# Patient Record
Sex: FEMALE | Race: WHITE | Employment: OTHER | URBAN - METROPOLITAN AREA
[De-identification: names, ages, dates, MRNs, and addresses within clinical notes are randomized per-mention and may not be internally consistent; named-entity substitution may affect disease eponyms.]

---

## 2017-01-05 ENCOUNTER — HOSPITAL ENCOUNTER (OUTPATIENT)
Dept: MAMMOGRAPHY | Age: 67
Discharge: HOME | End: 2017-01-05
Attending: OBSTETRICS & GYNECOLOGY
Payer: MEDICARE

## 2017-01-05 DIAGNOSIS — Z12.39 BREAST CANCER SCREENING: ICD-10-CM

## 2017-01-05 DIAGNOSIS — Z12.39 SCREENING FOR BREAST CANCER: ICD-10-CM

## 2017-01-05 DIAGNOSIS — C54.9 MALIGNANT NEOPLASM OF CORPUS UTERI, EXCEPT ISTHMUS (HCC): ICD-10-CM

## 2017-01-05 PROCEDURE — 77067 SCR MAMMO BI INCL CAD: CPT

## 2017-04-18 ENCOUNTER — OFFICE VISIT (OUTPATIENT)
Dept: GYNECOLOGY | Age: 67
End: 2017-04-18

## 2017-04-18 VITALS
HEIGHT: 63 IN | HEART RATE: 93 BPM | WEIGHT: 175.4 LBS | SYSTOLIC BLOOD PRESSURE: 164 MMHG | BODY MASS INDEX: 31.08 KG/M2 | DIASTOLIC BLOOD PRESSURE: 86 MMHG

## 2017-04-18 DIAGNOSIS — C54.9 MALIGNANT NEOPLASM OF CORPUS UTERI, EXCEPT ISTHMUS (HCC): Primary | ICD-10-CM

## 2017-04-18 RX ORDER — METHYLPREDNISOLONE 4 MG/1
TABLET ORAL
COMMUNITY
Start: 2017-02-06 | End: 2017-04-18 | Stop reason: ALTCHOICE

## 2017-04-18 RX ORDER — DICLOFENAC SODIUM 75 MG/1
TABLET, DELAYED RELEASE ORAL
COMMUNITY
Start: 2017-02-03 | End: 2020-03-16 | Stop reason: CLARIF

## 2017-04-18 RX ORDER — HYDROCODONE BITARTRATE AND ACETAMINOPHEN 5; 325 MG/1; MG/1
TABLET ORAL
COMMUNITY
Start: 2017-02-01 | End: 2020-03-16 | Stop reason: CLARIF

## 2017-04-18 RX ORDER — CLOTRIMAZOLE 10 MG/1
LOZENGE ORAL; TOPICAL
COMMUNITY
Start: 2017-02-09 | End: 2017-04-18 | Stop reason: ALTCHOICE

## 2017-04-18 RX ORDER — NYSTATIN 100000 [USP'U]/ML
SUSPENSION ORAL
COMMUNITY
Start: 2017-03-01 | End: 2017-04-18 | Stop reason: ALTCHOICE

## 2017-04-18 RX ORDER — CIPROFLOXACIN 250 MG/1
TABLET, FILM COATED ORAL
COMMUNITY
Start: 2017-03-01 | End: 2017-04-18 | Stop reason: ALTCHOICE

## 2017-04-18 RX ORDER — GUAIFENESIN 600 MG/1
600 TABLET, EXTENDED RELEASE ORAL 2 TIMES DAILY
COMMUNITY

## 2017-04-18 NOTE — PATIENT INSTRUCTIONS
[employer] Activation    Thank you for requesting access to [employer]. Please follow the instructions below to securely access and download your online medical record. [employer] allows you to send messages to your doctor, view your test results, renew your prescriptions, schedule appointments, and more. How Do I Sign Up? 1. In your internet browser, go to [email or web address]. 2. Click on the First Time User? Click Here link in the Sign In box. You will see the New Member Sign Up page. 3. Enter your [employer] Access Code exactly as it appears below. You will not need to use this code after youve completed the sign-up process. If you do not sign up before the expiration date, you must request a new code. [employer] Access Code: [email or web address]  Expires: 2017  2:25 PM (This is the date your [employer] access code will )    4. Enter the last four digits of your Social Security Number (xxxx) and Date of Birth (mm/dd/yyyy) as indicated and click Submit. You will be taken to the next sign-up page. 5. Create a [employer] ID. This will be your [employer] login ID and cannot be changed, so think of one that is secure and easy to remember. 6. Create a [employer] password. You can change your password at any time. 7. Enter your Password Reset Question and Answer. This can be used at a later time if you forget your password. 8. Enter your e-mail address. You will receive e-mail notification when new information is available in [address] 9. Click Sign Up. You can now view and download portions of your medical record. 10. Click the Download Summary menu link to download a portable copy of your medical information. Additional Information    If you have questions, please visit the Frequently Asked Questions section of the [employer] website at [email or web address] Remember, [employer] is NOT to be used for urgent needs. For medical emergencies, dial 911.

## 2017-04-18 NOTE — PROGRESS NOTES
[address] [facility] [address], [address] [facility]  [phone]  MD Lisa Smith MD    Patient ID:  Moy Clancy  [id number]  1950/66 y.o. Visit date: 4/18/2017    INTERVAL HISTORY:  Moy Clancy is a  female with a history of stage IA, grade 1 endometrial cancer diagnosed in August 2010. She underwent a laparoscopic hysterectomy with staging and required no adjuvant therapy based upon the favorable pathology. She presents today complaining about pressure in the vaginal area. Past Medical History:   Diagnosis Date    Arthritis     Spine    Contact dermatitis and other eczema, due to unspecified cause     roseacea    GERD (gastroesophageal reflux disease)     Hypercholesterolemia     Hypertension     Other ill-defined conditions     Nasal Allergies    Other ill-defined conditions     Tendonitis in Left Knee    Other ill-defined conditions     cancer uterine    Tendonitis of shoulder 2/15    left shoulder    Vertigo        Past Surgical History:   Procedure Laterality Date    ENDOSCOPY, COLON, DIAGNOSTIC  2008    HX CHOLECYSTECTOMY  1998    HX DILATION AND CURETTAGE  2006, 1974, 1973    HX HEENT  12/14    root canal    HX HYSTERECTOMY  8/24/10    TLH/BSO/Lap    HX TUBAL LIGATION         Social History     Social History    Marital status:      Spouse name: N/A    Number of children: N/A    Years of education: N/A     Occupational History    Not on file.      Social History Main Topics    Smoking status: Former Smoker     Quit date: 1/1/1991    Smokeless tobacco: Former User    Alcohol use No    Drug use: No    Sexual activity: Yes     Partners: Male     Birth control/ protection: None     Other Topics Concern    Not on file     Social History Narrative       Family History   Problem Relation Age of Onset    Hypertension Mother     Stroke Mother     Diabetes Mother     Stroke Father  Stroke Brother     Diabetes Brother     Hypertension Brother     Cancer Maternal Uncle      colon cancer    Cancer Maternal Uncle      colon cancer       Current Outpatient Prescriptions on File Prior to Visit   Medication Sig Dispense Refill    calcium-cholecalciferol, d3, (CALCIUM 600 + D) 600-125 mg-unit tab Take  by mouth.  esomeprazole (NEXIUM) 40 mg capsule Take  by mouth daily.  EPIPEN 0.3 mg/0.3 mL injection       VESICARE 10 mg tablet       cetirizine (ZYRTEC) 10 mg tablet Take 10 mg by mouth daily.  indapamide (LOZOL) 2.5 mg tablet Take 2.5 mg by mouth daily.  fluticasone (FLONASE) 50 mcg/Actuation nasal spray 2 Sprays by Nasal route daily.  aspirin 81 mg tablet Take  by mouth daily.  carvedilol (COREG) 25 mg tablet Take 25 mg by mouth two (2) times daily (with meals).  multivitamins-ca-iron-minerals (ONE-A-DAY WOMENS FORMULA) 27-0.4 mg Tab Take 1 Tab by mouth daily. No current facility-administered medications on file prior to visit.         Allergies   Allergen Reactions    Bactrim [Sulfamethoxazole-Trimethoprim] Rash    Food Extracts Other (comments)     Jalapeno Peppers:  Numbness and Swelling    Metronidazole Nausea and Vomiting    Morphine Nausea Only    Penicillins Rash    Seldane Other (comments)     Memory loss    Vioxx [Rofecoxib] Other (comments)     Tongue swells, facial numbeness       ROS:  Negative    OBJECTIVE:  PHYSICAL EXAM  VITAL SIGNS: Visit Vitals    /86 (BP 1 Location: Right arm, BP Patient Position: Sitting)    Pulse 93    Ht 5' 2.99\" (1.6 m)    Wt 175 lb 6.4 oz (79.6 kg)    BMI 31.08 kg/m2      GENERAL FABIO: in no apparent distress and well developed and well nourished   HEENT: within normal limits   RESPIRATORY: lungs clear to auscultation   CARDIOVASC: Regular rate and rhythm   GASTROINT: soft, non-tender, without masses or organomegaly   MUSCULOSKEL: no joint tenderness, deformity or swelling   INTEGUMENT: no rash or abnormalities   EXTREMITIES: extremities normal, atraumatic, no cyanosis or edema   PELVIC: External genitalia: normal general appearance  Vaginal: normal without tenderness, induration or masses; large cystocele  Uterus: absent   RECTAL: rectal exam not indicated   RTICHIE SURVEY: Cervical, supraclavicular, and axillary nodes normal.   NEURO: Grossly normal       IMPRESSION AND PLAN:  Teri Hayes has a history of stage I, grade 1 endometrial cancer. She has no evidence of disease on exam.  I reassured her that the pressure she is feeling is a cystocele. She wishes to go see a urologist for evaluation for possible surgery. She is going to see someone at [facility].       Kanwal Cook MD  4/18/2017/2:44 PM

## 2017-04-18 NOTE — PROGRESS NOTES
Pt c/o lower abdominal pressure when walking and feels like she may have a prolapse. The pt is no longer taking Hydrocodone.

## 2018-01-12 ENCOUNTER — HOSPITAL ENCOUNTER (OUTPATIENT)
Dept: VASCULAR SURGERY | Age: 68
Discharge: HOME | End: 2018-01-12
Attending: INTERNAL MEDICINE
Payer: MEDICARE

## 2018-01-12 DIAGNOSIS — M79.661 PAIN OF RIGHT LOWER LEG: ICD-10-CM

## 2018-01-12 PROCEDURE — 93970 EXTREMITY STUDY: CPT

## 2018-01-12 NOTE — PROGRESS NOTES
[facility]  Preliminary Report:  Venous Duplex Leg    Bilateral leg venous duplex was performed. All deep and superficial veins appear compressible with normal Doppler characteristics. Final report to follow.

## 2018-01-12 NOTE — PROCEDURES
[facility]  *** FINAL REPORT ***    Name: Charla Bravo  MRN: [id number]    Outpatient  : 1950  HIS Order #: [id number]  [address] [facility] Visit #: [id number]  Date: 2018    TYPE OF TEST: Peripheral Venous Testing    REASON FOR TEST  Pain in limb    Right Leg:-  Deep venous thrombosis:           No  Superficial venous thrombosis:    No  Deep venous insufficiency:        No  Superficial venous insufficiency: No    Left Leg:-  Deep venous thrombosis:           No  Superficial venous thrombosis:    No  Deep venous insufficiency:        No  Superficial venous insufficiency: No      INTERPRETATION/FINDINGS  PROCEDURE:  Venous duplex examination using B-mode, color flow and  spectral Doppler of the lower extremity veins. Right leg :  1. Deep vein(s) visualized include the common femoral, proximal  femoral, mid femoral, distal femoral, popliteal(above knee),  popliteal(fossa), popliteal(below knee), posterior tibial and peroneal   veins. 2. No evidence of deep venous thrombosis detected in the veins  visualized. 3. Superficial vein(s) visualized include the great saphenous vein. 4. No evidence of superficial thrombosis detected. 5. No evidence of reflux detected in the deep veins visualized. 6. No evidence of reflux detected in the superficial veins visualized. Left leg :  1. Deep vein(s) visualized include the common femoral, proximal  femoral, mid femoral, distal femoral, popliteal(above knee),  popliteal(fossa), popliteal(below knee), posterior tibial and peroneal   veins. 2. No evidence of deep venous thrombosis detected in the veins  visualized. 3. Superficial vein(s) visualized include the great saphenous vein. 4. No evidence of superficial thrombosis detected. 5. No evidence of reflux detected in the deep veins visualized. 6. No evidence of reflux detected in the superficial veins visualized.     ADDITIONAL COMMENTS    I have personally reviewed the data relevant to the interpretation of  this  study.     TECHNOLOGIST: Saul Boone RVT  Signed: 01/12/2018 02:18 PM    PHYSICIAN: Neena Lundborg, MD  Signed: 01/12/2018 06:16 PM

## 2018-03-06 ENCOUNTER — OFFICE VISIT (OUTPATIENT)
Dept: GYNECOLOGY | Age: 68
End: 2018-03-06

## 2018-03-06 ENCOUNTER — HOSPITAL ENCOUNTER (OUTPATIENT)
Dept: LAB | Age: 68
Discharge: HOME | End: 2018-03-06
Payer: MEDICARE

## 2018-03-06 VITALS
HEIGHT: 63 IN | BODY MASS INDEX: 31.43 KG/M2 | HEART RATE: 74 BPM | DIASTOLIC BLOOD PRESSURE: 90 MMHG | WEIGHT: 177.4 LBS | SYSTOLIC BLOOD PRESSURE: 142 MMHG

## 2018-03-06 DIAGNOSIS — C54.9 MALIGNANT NEOPLASM OF CORPUS UTERI, EXCEPT ISTHMUS (HCC): Primary | ICD-10-CM

## 2018-03-06 PROCEDURE — 88175 CYTOPATH C/V AUTO FLUID REDO: CPT | Performed by: OBSTETRICS & GYNECOLOGY

## 2018-03-06 RX ORDER — MELATONIN
DAILY
COMMUNITY

## 2018-03-06 NOTE — PROGRESS NOTES
One year check up. Pt states she had a mesh/sling for a prolapse bladder in 6/2017. Pt had rectocele/cystocele repair three weeks ago.

## 2018-03-06 NOTE — PROGRESS NOTES
[address], [address] [place]  [phone]  MD Paris Cummins MD    Patient ID:  Casa Casas  [id number]  1950/67 y.o. Visit date: 3/6/2018    INTERVAL HISTORY:  Casa Casas is a  female with a history of stage IA, grade 1 endometrial cancer diagnosed in August 2010. She underwent a laparoscopic hysterectomy with staging and required no adjuvant therapy based upon the favorable pathology. She presents today for routine surveillance. She is doing well and without complaints. She just had a recent repair of a cystocele and rectocele. Past Medical History:   Diagnosis Date    Arthritis     Spine    Contact dermatitis and other eczema, due to unspecified cause     roseacea    GERD (gastroesophageal reflux disease)     Hypercholesterolemia     Hypertension     Other ill-defined conditions(799.89)     Nasal Allergies    Other ill-defined conditions(799.89)     Tendonitis in Left Knee    Other ill-defined conditions(799.89)     cancer uterine    Tendonitis of shoulder 2/15    left shoulder    Vertigo        Past Surgical History:   Procedure Laterality Date    ENDOSCOPY, COLON, DIAGNOSTIC  2008    HX CHOLECYSTECTOMY  1998    HX DILATION AND CURETTAGE  2006, 1974, 1973    HX HEENT  12/14    root canal    HX HYSTERECTOMY  8/24/10    TLH/BSO/Lap    HX TUBAL LIGATION         Social History     Social History    Marital status:      Spouse name: N/A    Number of children: N/A    Years of education: N/A     Occupational History    Not on file.      Social History Main Topics    Smoking status: Former Smoker     Quit date: 1/1/1991    Smokeless tobacco: Former User    Alcohol use No    Drug use: No    Sexual activity: Yes     Partners: Male     Birth control/ protection: None     Other Topics Concern    Not on file     Social History Narrative       Family History   Problem Relation Age of Onset    Hypertension Mother     Stroke Mother     Diabetes Mother     Stroke Father     Stroke Brother     Diabetes Brother     Hypertension Brother     Cancer Maternal Uncle      colon cancer    Cancer Maternal Uncle      colon cancer       Current Outpatient Prescriptions on File Prior to Visit   Medication Sig Dispense Refill    diclofenac EC (VOLTAREN) 75 mg EC tablet       HYDROcodone-acetaminophen (NORCO) 5-325 mg per tablet       guaiFENesin ER (MUCINEX) 600 mg ER tablet Take 600 mg by mouth two (2) times a day.  calcium-cholecalciferol, d3, (CALCIUM 600 + D) 600-125 mg-unit tab Take  by mouth.  esomeprazole (NEXIUM) 40 mg capsule Take  by mouth daily.  EPIPEN 0.3 mg/0.3 mL injection       VESICARE 10 mg tablet       carvedilol (COREG) 25 mg tablet Take 25 mg by mouth two (2) times daily (with meals).  cetirizine (ZYRTEC) 10 mg tablet Take 10 mg by mouth daily.  indapamide (LOZOL) 2.5 mg tablet Take 2.5 mg by mouth daily.  fluticasone (FLONASE) 50 mcg/Actuation nasal spray 2 Sprays by Nasal route daily.  aspirin 81 mg tablet Take  by mouth daily.  multivitamins-ca-iron-minerals (ONE-A-DAY WOMENS FORMULA) 27-0.4 mg Tab Take 1 Tab by mouth daily. No current facility-administered medications on file prior to visit.         Allergies   Allergen Reactions    Bactrim [Sulfamethoxazole-Trimethoprim] Rash    Food Extracts Other (comments)     Jalapeno Peppers:  Numbness and Swelling    Metronidazole Nausea and Vomiting    Morphine Nausea Only    Penicillins Rash    Seldane Other (comments)     Memory loss    Vioxx [Rofecoxib] Other (comments)     Tongue swells, facial numbeness       ROS:  Negative    OBJECTIVE:  PHYSICAL EXAM  VITAL SIGNS: Visit Vitals    Ht 5' 2.99\" (1.6 m)    Wt 177 lb 6.4 oz (80.5 kg)    BMI 31.43 kg/m2      GENERAL FABIO: in no apparent distress and well developed and well nourished   HEENT: within normal limits   RESPIRATORY: lungs clear to auscultation   CARDIOVASC: Regular rate and rhythm   GASTROINT: soft, non-tender, without masses or organomegaly   MUSCULOSKEL: no joint tenderness, deformity or swelling   INTEGUMENT: no rash or abnormalities   EXTREMITIES: extremities normal, atraumatic, no cyanosis or edema   PELVIC: External genitalia: normal general appearance  Vaginal: normal without tenderness, induration or masses  Uterus: absent   RECTAL: rectal exam not indicated   RITCHIE SURVEY: Cervical, supraclavicular, and axillary nodes normal.   NEURO: Grossly normal       IMPRESSION AND PLAN:  Laurent Moralez has a history of stage I, grade 1 endometrial cancer. She has no evidence of disease on exam.  We will plan on seeing her back in one year for further surveillance.       Keyonna Jhaveri MD  3/6/2018/2:44 PM

## 2018-03-09 ENCOUNTER — HOSPITAL ENCOUNTER (OUTPATIENT)
Dept: MAMMOGRAPHY | Age: 68
Discharge: HOME | End: 2018-03-09
Attending: INTERNAL MEDICINE
Payer: MEDICARE

## 2018-03-09 DIAGNOSIS — Z12.39 SCREENING BREAST EXAMINATION: ICD-10-CM

## 2018-03-09 PROCEDURE — 77067 SCR MAMMO BI INCL CAD: CPT

## 2019-04-15 NOTE — PROGRESS NOTES
Year check up for history of endometrial cancer. Patient reports no abnormal bleeding or pain. 1. Have you been to the ER, urgent care clinic since your last visit? Hospitalized since your last visit? No    2. Have you seen or consulted any other health care providers outside of the [address] [place] [address] since your last visit? Include any pap smears or colon screening.  No

## 2019-04-16 ENCOUNTER — OFFICE VISIT (OUTPATIENT)
Dept: GYNECOLOGY | Age: 69
End: 2019-04-16

## 2019-04-16 VITALS
BODY MASS INDEX: 32.78 KG/M2 | HEART RATE: 78 BPM | WEIGHT: 185 LBS | DIASTOLIC BLOOD PRESSURE: 85 MMHG | SYSTOLIC BLOOD PRESSURE: 162 MMHG | HEIGHT: 63 IN

## 2019-04-16 DIAGNOSIS — C54.9 MALIGNANT NEOPLASM OF CORPUS UTERI, EXCEPT ISTHMUS (HCC): Primary | ICD-10-CM

## 2019-04-16 RX ORDER — RANITIDINE 150 MG/1
150 TABLET, FILM COATED ORAL 2 TIMES DAILY
COMMUNITY
End: 2020-03-16 | Stop reason: CLARIF

## 2019-04-16 RX ORDER — LEVOCETIRIZINE DIHYDROCHLORIDE 5 MG/1
TABLET, FILM COATED ORAL
COMMUNITY
End: 2020-03-16 | Stop reason: CLARIF

## 2019-04-16 NOTE — PROGRESS NOTES
[facility]  [address] [facility], [address] [facility]  [phone]      Patient ID:  Leslie Sierra  [id number]  1950/68 y.o. Visit date: 4/16/2019    INTERVAL HISTORY:  Leslie Sierar is a  female with a history of stage IA, grade 1 endometrial cancer diagnosed in August 2010. She underwent a laparoscopic hysterectomy with staging and required no adjuvant therapy based upon the favorable pathology. She had a repair of a cystocele and rectocele in early 2018. She presents today for routine surveillance. She is doing well and without complaints.         Past Medical History:   Diagnosis Date    Arthritis     Spine    Contact dermatitis and other eczema, due to unspecified cause     roseacea    GERD (gastroesophageal reflux disease)     Hypercholesterolemia     Hypertension     Other ill-defined conditions(799.89)     Nasal Allergies    Other ill-defined conditions(799.89)     Tendonitis in Left Knee    Other ill-defined conditions(799.89)     cancer uterine    Tendonitis of shoulder 2/15    left shoulder    Vertigo        Past Surgical History:   Procedure Laterality Date    ENDOSCOPY, COLON, DIAGNOSTIC  2008    HX CHOLECYSTECTOMY  1998    HX DILATION AND CURETTAGE  2006, 1974, 1973    HX HEENT  12/14    root canal    HX HYSTERECTOMY  8/24/10    TLH/BSO/Lap    HX TUBAL LIGATION         Social History     Socioeconomic History    Marital status:      Spouse name: Not on file    Number of children: Not on file    Years of education: Not on file    Highest education level: Not on file   Occupational History    Not on file   Social Needs    Financial resource strain: Not on file    Food insecurity:     Worry: Not on file     Inability: Not on file    Transportation needs:     Medical: Not on file     Non-medical: Not on file   Tobacco Use    Smoking status: Former Smoker     Last attempt to quit: 1/1/1991     Years since quittin.3    Smokeless tobacco: Former User   Substance and Sexual Activity    Alcohol use: No    Drug use: No    Sexual activity: Yes     Partners: Male     Birth control/protection: None   Lifestyle    Physical activity:     Days per week: Not on file     Minutes per session: Not on file    Stress: Not on file   Relationships    Social connections:     Talks on phone: Not on file     Gets together: Not on file     Attends Spiritism service: Not on file     Active member of club or organization: Not on file     Attends meetings of clubs or organizations: Not on file     Relationship status: Not on file    Intimate partner violence:     Fear of current or ex partner: Not on file     Emotionally abused: Not on file     Physically abused: Not on file     Forced sexual activity: Not on file   Other Topics Concern    Not on file   Social History Narrative    Not on file       Family History   Problem Relation Age of Onset    Hypertension Mother     Stroke Mother     Diabetes Mother     Stroke Father     Stroke Brother     Diabetes Brother     Hypertension Brother     Cancer Maternal Uncle         colon cancer    Cancer Maternal Uncle         colon cancer       Current Outpatient Medications on File Prior to Visit   Medication Sig Dispense Refill    raNITIdine (ZANTAC) 150 mg tablet Take 150 mg by mouth two (2) times a day.  levocetirizine (XYZAL) 5 mg tablet Take  by mouth.  guaiFENesin ER (MUCINEX) 600 mg ER tablet Take 600 mg by mouth two (2) times a day.  esomeprazole (NEXIUM) 40 mg capsule Take  by mouth daily.  EPIPEN 0.3 mg/0.3 mL injection       carvedilol (COREG) 25 mg tablet Take 25 mg by mouth two (2) times daily (with meals).  cetirizine (ZYRTEC) 10 mg tablet Take 10 mg by mouth daily.  indapamide (LOZOL) 2.5 mg tablet Take 2.5 mg by mouth daily.  fluticasone (FLONASE) 50 mcg/Actuation nasal spray 2 Sprays by Nasal route daily.       multivitamins-ca-iron-minerals (ONE-A-DAY WOMENS FORMULA) 27-0.4 mg Tab Take 1 Tab by mouth daily.  cholecalciferol (VITAMIN D3) 1,000 unit tablet Take  by mouth daily.  diclofenac EC (VOLTAREN) 75 mg EC tablet       HYDROcodone-acetaminophen (NORCO) 5-325 mg per tablet       calcium-cholecalciferol, d3, (CALCIUM 600 + D) 600-125 mg-unit tab Take  by mouth.  VESICARE 10 mg tablet       aspirin 81 mg tablet Take  by mouth daily. No current facility-administered medications on file prior to visit. Allergies   Allergen Reactions    Bactrim [Sulfamethoxazole-Trimethoprim] Rash    Clindamycin Itching    Erythromycin Itching    Food Extracts Other (comments)     Jalapeno Peppers:  Numbness and Swelling    Metronidazole Nausea and Vomiting    Morphine Nausea Only    Penicillins Rash    Seldane Other (comments)     Memory loss    Vioxx [Rofecoxib] Other (comments)     Tongue swells, facial numbeness       ROS:  Negative    OBJECTIVE:  PHYSICAL EXAM  VITAL SIGNS: Visit Vitals  /85 (BP 1 Location: Right arm, BP Patient Position: Sitting)   Pulse 78   Ht 5' 2.99\" (1.6 m)   Wt 185 lb (83.9 kg)   BMI 32.78 kg/m²      GENERAL FABIO: in no apparent distress and well developed and well nourished   HEENT: within normal limits   RESPIRATORY: lungs clear to auscultation   CARDIOVASC: Regular rate and rhythm   GASTROINT: soft, non-tender, without masses or organomegaly   MUSCULOSKEL: no joint tenderness, deformity or swelling   INTEGUMENT: no rash or abnormalities   EXTREMITIES: extremities normal, atraumatic, no cyanosis or edema   PELVIC: External genitalia: normal general appearance  Vaginal: normal without tenderness, induration or masses  Uterus: absent   RECTAL: rectal exam not indicated   RITCHIE SURVEY: Cervical, supraclavicular, and axillary nodes normal.   NEURO: Grossly normal       IMPRESSION AND PLAN:  She Delgado has a history of stage I, grade 1 endometrial cancer.   She has no evidence of disease on exam.  We will plan on seeing her back in one year for further surveillance.       Porter Do MD  4/16/2019/2:44 PM

## 2019-04-22 ENCOUNTER — HOSPITAL ENCOUNTER (OUTPATIENT)
Dept: MAMMOGRAPHY | Age: 69
Discharge: HOME | End: 2019-04-22
Attending: INTERNAL MEDICINE
Payer: MEDICARE

## 2019-04-22 DIAGNOSIS — Z12.39 BREAST SCREENING, UNSPECIFIED: ICD-10-CM

## 2019-04-22 PROCEDURE — 77067 SCR MAMMO BI INCL CAD: CPT

## 2020-06-12 ENCOUNTER — ANESTHESIA (OUTPATIENT)
Dept: ENDOSCOPY | Age: 70
End: 2020-06-12
Payer: MEDICARE

## 2020-06-12 ENCOUNTER — HOSPITAL ENCOUNTER (OUTPATIENT)
Age: 70
Setting detail: OUTPATIENT SURGERY
Discharge: HOME | End: 2020-06-12
Attending: INTERNAL MEDICINE | Admitting: INTERNAL MEDICINE
Payer: MEDICARE

## 2020-06-12 ENCOUNTER — ANESTHESIA EVENT (OUTPATIENT)
Dept: ENDOSCOPY | Age: 70
End: 2020-06-12
Payer: MEDICARE

## 2020-06-12 VITALS
TEMPERATURE: 98.2 F | RESPIRATION RATE: 14 BRPM | HEART RATE: 84 BPM | DIASTOLIC BLOOD PRESSURE: 75 MMHG | OXYGEN SATURATION: 99 % | SYSTOLIC BLOOD PRESSURE: 148 MMHG

## 2020-06-12 PROCEDURE — 76060000031 HC ANESTHESIA FIRST 0.5 HR: Performed by: INTERNAL MEDICINE

## 2020-06-12 PROCEDURE — 88305 TISSUE EXAM BY PATHOLOGIST: CPT

## 2020-06-12 PROCEDURE — 77030013992 HC SNR POLYP ENDOSC BSC -B: Performed by: INTERNAL MEDICINE

## 2020-06-12 PROCEDURE — 74011250636 HC RX REV CODE- 250/636: Performed by: INTERNAL MEDICINE

## 2020-06-12 PROCEDURE — 74011000250 HC RX REV CODE- 250: Performed by: ANESTHESIOLOGY

## 2020-06-12 PROCEDURE — 76040000019: Performed by: INTERNAL MEDICINE

## 2020-06-12 PROCEDURE — 74011250636 HC RX REV CODE- 250/636: Performed by: ANESTHESIOLOGY

## 2020-06-12 RX ORDER — SODIUM CHLORIDE 9 MG/ML
100 INJECTION, SOLUTION INTRAVENOUS CONTINUOUS
Status: DISCONTINUED | OUTPATIENT
Start: 2020-06-12 | End: 2020-06-12 | Stop reason: HOSPADM

## 2020-06-12 RX ORDER — SODIUM CHLORIDE 0.9 % (FLUSH) 0.9 %
5-40 SYRINGE (ML) INJECTION AS NEEDED
Status: DISCONTINUED | OUTPATIENT
Start: 2020-06-12 | End: 2020-06-12 | Stop reason: HOSPADM

## 2020-06-12 RX ORDER — LIDOCAINE HYDROCHLORIDE 20 MG/ML
INJECTION, SOLUTION EPIDURAL; INFILTRATION; INTRACAUDAL; PERINEURAL AS NEEDED
Status: DISCONTINUED | OUTPATIENT
Start: 2020-06-12 | End: 2020-06-12 | Stop reason: HOSPADM

## 2020-06-12 RX ORDER — NALOXONE HYDROCHLORIDE 0.4 MG/ML
0.4 INJECTION, SOLUTION INTRAMUSCULAR; INTRAVENOUS; SUBCUTANEOUS
Status: DISCONTINUED | OUTPATIENT
Start: 2020-06-12 | End: 2020-06-12 | Stop reason: HOSPADM

## 2020-06-12 RX ORDER — FLUMAZENIL 0.1 MG/ML
0.2 INJECTION INTRAVENOUS
Status: DISCONTINUED | OUTPATIENT
Start: 2020-06-12 | End: 2020-06-12 | Stop reason: HOSPADM

## 2020-06-12 RX ORDER — PROPOFOL 10 MG/ML
INJECTION, EMULSION INTRAVENOUS AS NEEDED
Status: DISCONTINUED | OUTPATIENT
Start: 2020-06-12 | End: 2020-06-12 | Stop reason: HOSPADM

## 2020-06-12 RX ORDER — ATROPINE SULFATE 0.1 MG/ML
0.5 INJECTION INTRAVENOUS
Status: DISCONTINUED | OUTPATIENT
Start: 2020-06-12 | End: 2020-06-12 | Stop reason: HOSPADM

## 2020-06-12 RX ORDER — SODIUM CHLORIDE 0.9 % (FLUSH) 0.9 %
5-40 SYRINGE (ML) INJECTION EVERY 8 HOURS
Status: DISCONTINUED | OUTPATIENT
Start: 2020-06-12 | End: 2020-06-12 | Stop reason: HOSPADM

## 2020-06-12 RX ORDER — DEXTROMETHORPHAN/PSEUDOEPHED 2.5-7.5/.8
1.2 DROPS ORAL
Status: DISCONTINUED | OUTPATIENT
Start: 2020-06-12 | End: 2020-06-12 | Stop reason: HOSPADM

## 2020-06-12 RX ADMIN — SODIUM CHLORIDE 100 ML/HR: 900 INJECTION, SOLUTION INTRAVENOUS at 09:30

## 2020-06-12 RX ADMIN — PROPOFOL 200 MG: 10 INJECTION, EMULSION INTRAVENOUS at 09:50

## 2020-06-12 RX ADMIN — LIDOCAINE HYDROCHLORIDE 40 MG: 20 INJECTION, SOLUTION EPIDURAL; INFILTRATION; INTRACAUDAL; PERINEURAL at 09:28

## 2020-06-12 NOTE — DISCHARGE INSTRUCTIONS
Alivia Motley MD  [facility], [address]  [place], [address] [facility]  [phone]  www. [email or web address]    Rich Patton  [id number]  1950    COLON DISCHARGE INSTRUCTIONS  Discomfort:  Redness at IV site- apply warm compress to area; if redness or soreness persist- contact your physician  There may be a slight amount of blood passed from the rectum  Gaseous discomfort- walking, belching will help relieve any discomfort  You may not operate a vehicle for 12 hours  You may not engage in an occupation involving machinery or appliances for rest of today  You may not drink alcoholic beverages for at least 12 hours  Avoid making any critical decisions for at least 24 hour  DIET:   High fiber diet. - however -  remember your colon is empty and a heavy meal will produce gas. Avoid these foods:  vegetables, fried / greasy foods, carbonated drinks for today      ACTIVITY:  You may resume your normal daily activities it is recommended that you spend the remainder of the day resting -  avoid any strenuous activity. CALL M.D. ANY SIGN OF:   Increasing pain, nausea, vomiting  Abdominal distension (swelling)  New increased bleeding (oral or rectal)  Fever (chills)  Pain in chest area  Bloody discharge from nose or mouth  Shortness of breath     COLONOSCOPY FINDINGS:  Your colonoscopy showed: two small polyps which were removed. Follow-up Instructions:   Call Dr. Alivia Motley if any questions or problems. Telephone # [phone]  Dr. Walker Rousseau office will notify you of the biopsy results within 7 to 10 days. Should have a repeat colonoscopy in 5 years.

## 2020-06-12 NOTE — ROUTINE PROCESS
Elisa Greenwood [facility]  1950  [id number]    Situation:  Verbal report received from: Olivia MATTHEWS  Procedure: Procedure(s):  COLONOSCOPY  ENDOSCOPIC POLYPECTOMY    Background:    Preoperative diagnosis: HX OF POLYPS  Postoperative diagnosis: Polyps, Hemorrhoids    :  Dr. Jf Troncoso  Assistant(s): Endoscopy Technician-1: Stephan Mercado  Endoscopy RN-1: Abbe Estrella    Specimens:   ID Type Source Tests Collected by Time Destination   1 : polyps Preservative Colon, Ascending  Zuleyka Connolly MD 6/12/2020 2789 Pathology     H. Pylori  no    Assessment:  Intra-procedure medications       Anesthesia gave intra-procedure sedation and medications, see anesthesia flow sheet yes    Intravenous fluids: NS@ KVO     Vital signs stable       Abdominal assessment: round and soft       Recommendation:  Discharge patient per MD order.     Family or Friend  Brian Borrego  Permission to share finding with family or friend yes

## 2020-06-12 NOTE — PROGRESS NOTES
Sandra [facility]  1950  [id number]    Situation:  Verbal report received from: Pedro Marroquin RN  Procedure: Procedure(s):  COLONOSCOPY  ENDOSCOPIC POLYPECTOMY    Background:    Preoperative diagnosis: HX OF POLYPS  Postoperative diagnosis: Polyps, Hemorrhoids    :  Dr. Vin Phillips  Assistant(s): Endoscopy Technician-1: Charline Carrillo  Endoscopy RN-1: Philippe Both    Specimens:   ID Type Source Tests Collected by Time Destination   1 : polyps Preservative Colon, Ascending  Lou Rueda MD 6/12/2020 7829 Pathology     H. Pylori  no    Assessment:  Intra-procedure medications   33219::\"100\"} mcg    Anesthesia gave intra-procedure sedation and medications, see anesthesia flow sheet yes    Intravenous fluids: NS@ KVO     Vital signs stable yes    Abdominal assessment: round and soft yes    Recommendation:  Discharge patient per MD order yes.   Return to floor no  Family or Deetta Overall,   Permission to share finding with family or friend yes

## 2020-06-12 NOTE — PROCEDURES
[facility]                  Colonoscopy Operative Report    6/12/2020      Sanjiv Hatch  [id number]  1950    Procedure Type:   Colonoscopy --screening     Indications:    Personal history of colon polyps (screening only)     Pre-operative Diagnosis: see indication above    Post-operative Diagnosis:  See findings below    :  Ara Salgado MD    Referring Provider: Rahul Fulton MD      Sedation:  MAC anesthesia Propofol    Pre-Procedural Exam:      Airway: clear,  No airway problems anticipated  Heart: RRR, without gallops or rubs  Lungs: clear bilaterally without wheezes, crackles, or rhonchi  Abdomen: soft, nontender, nondistended, bowel sounds present  Mental Status: awake, alert and oriented to person, place and time     Procedure Details:  After informed consent was obtained with all risks and benefits of procedure explained and preoperative exam completed, the patient was taken to the endoscopy suite and placed in the left lateral decubitus position. Upon sequential sedation as per above, a digital rectal exam was performed . The Olympus videocolonoscope  was inserted in the rectum and carefully advanced to the cecum, which was identified by the ileocecal valve and appendiceal orifice. The cecum was identified by the ileocecal valve and appendiceal orifice. The quality of preparation was good. The colonoscope was slowly withdrawn with careful evaluation between folds. Retroflexion in the rectum was completed demonstrating internal hemorrhoids. Findings:   Rectum: Grade 1 internal hemorrhoid(s); Sigmoid: normal  Descending Colon: normal  Transverse Colon: normal  Ascending Colon: two polyps, 4 and 7 mm, cold snared and retrieved  Cecum: normal  Terminal Ileum: not intubated      Specimen Removed:  ascending colonic polyps    Complications: None. EBL:  None.     Impression:    hemorrhoids internal, Small in size  two small colonic polyps ascending colon    Recommendations: --Await pathology. , -Repeat colonoscopy in 5 years. High fiber diet. Resume normal medication(s). Discharge Disposition:  Home in the company of a  when able to ambulate. Bj Stern MD    6/12/2020     EMELINA Foley MD  [facility], [address] [facility] [address] [place] [address]  [phone]  [email or web address]

## 2020-06-12 NOTE — H&P
Mary Wooten MD  [facility], [address] [place] [address]  [phone]  [email or web address]    Gastroenterology Outpatient History and Physical    Patient: Alexandre Barron    Physician: Che Power MD    Vital Signs: Pulse 98, resp. rate 16, SpO2 97 %. Allergies: Allergies   Allergen Reactions    Food Extracts Anaphylaxis     Jalapeno Peppers:  Numbness and Swelling    Bactrim [Sulfamethoxazole-Trimethoprim] Rash    Clindamycin Hives and Itching    Erythromycin Hives and Itching    Metronidazole Hives, Itching and Nausea and Vomiting    Morphine Nausea and Vomiting     Severe vomitting    Penicillins Rash    Seldane Palpitations     Memory loss    Vioxx [Rofecoxib] Other (comments)     Tongue swells, facial numbeness       Chief Complaint: Hx of polyps    History of Present Illness: Personal history of colonic polyps. Last colonoscopy   showed no polyps. Currently has no GI symptoms. Positive FH of colon cancer or polyps.       History:  Past Medical History:   Diagnosis Date    Arrhythmia     PVC's    Arthritis     Spine    Cancer ([facility] [address] [facility] 2010    Uternine - hysterectomy - no treatments required    Contact dermatitis and other eczema, due to unspecified cause     roseacea    GERD (gastroesophageal reflux disease)     Hypercholesterolemia     Hypertension     Other ill-defined conditions(799.89)     Nasal Allergies    Other ill-defined conditions(799.89)     Tendonitis in Left Knee    Other ill-defined conditions(799.89)     cancer uterine    Tendonitis of shoulder 2/15    left shoulder    Vertigo       Past Surgical History:   Procedure Laterality Date    ENDOSCOPY, COLON, DIAGNOSTIC  2008    HX CHOLECYSTECTOMY  1998    HX DILATION AND CURETTAGE  2006, 1974, 1973    HX HEENT  12/14    root canal    HX HYSTERECTOMY  8/24/10    TLH/BSO/Lap    HX OOPHORECTOMY Bilateral     HX TUBAL LIGATION  HX UROLOGICAL  2018    cystocele/rectalcele     HX UROLOGICAL  2017    bladder tact      Social History     Socioeconomic History    Marital status:      Spouse name: Not on file    Number of children: Not on file    Years of education: Not on file    Highest education level: Not on file   Tobacco Use    Smoking status: Former Smoker     Last attempt to quit: 1991     Years since quittin.4    Smokeless tobacco: Former User   Substance and Sexual Activity    Alcohol use: No    Drug use: No    Sexual activity: Yes     Partners: Male     Birth control/protection: None      Family History   Problem Relation Age of Onset    Hypertension Mother     Stroke Mother     Diabetes Mother     Stroke Father     Stroke Brother     Diabetes Brother     Hypertension Brother     Cancer Maternal Uncle         colon cancer    Cancer Maternal Uncle         colon cancer      Patient Active Problem List   Diagnosis Code    Malignant ne[facility] of corpus uteri, except [facility] C[facility].9       Medications:   Prior to Admission medications    Medication Sig Start Date End Date Taking? Authorizing Provider   carvedilol (COREG) 25 mg tablet Take 25 mg by mouth two (2) times daily (with meals). Yes Provider, Historical   cholecalciferol (VITAMIN D3) 1,000 unit tablet Take  by mouth daily. Provider, Historical   guaiFENesin ER (MUCINEX) 600 mg ER tablet Take 600 mg by mouth two (2) times a day. Provider, Historical   esomeprazole (NEXIUM) 40 mg capsule Take  by mouth daily. Provider, Historical   EPIPEN 0.3 mg/0.3 mL injection  12   Provider, Historical   cetirizine (ZYRTEC) 10 mg tablet Take 10 mg by mouth daily. Provider, Historical   indapamide (LOZOL) 2.5 mg tablet Take 2.5 mg by mouth daily. Provider, Historical   fluticasone (FLONASE) 50 mcg/Actuation nasal spray 2 Sprays by Nasal route daily.  6/1/10   Provider, Historical   multivitamins-ca-iron-minerals (ONE-A-DAY WOMENS FORMULA) 27-0.4 mg Tab Take 1 Tab by mouth daily.  6/1/10   Provider, Historical       Physical Exam:     General: well developed, well nourished   HEENT: unremarkable   Heart: regular rhythm no mumur    Lungs: clear   Abdominal:  benign   Neurological: unremarkable   Extremities: no edema     Findings/Diagnosis: Personal history of colonic polyps  Plan of Care/Planned Procedure: Colonoscopy with monitored anesthesia care sedation    Signed:  Hassan Rinne., MD 6/12/2020

## 2020-06-12 NOTE — ANESTHESIA PREPROCEDURE EVALUATION
Relevant Problems   No relevant active problems       Anesthetic History   No history of anesthetic complications            Review of Systems / Medical History  Patient summary reviewed, nursing notes reviewed and pertinent labs reviewed    Pulmonary  Within defined limits                 Neuro/Psych   Within defined limits           Cardiovascular    Hypertension        Dysrhythmias : PVC  Hyperlipidemia    Exercise tolerance: >4 METS     GI/Hepatic/Renal     GERD           Endo/Other        Arthritis     Other Findings              Physical Exam    Airway  Mallampati: I  TM Distance: 4 - 6 cm  Neck ROM: normal range of motion   Mouth opening: Normal     Cardiovascular  Regular rate and rhythm,  S1 and S2 normal,  no murmur, click, rub, or gallop             Dental  No notable dental hx       Pulmonary  Breath sounds clear to auscultation               Abdominal  GI exam deferred       Other Findings            Anesthetic Plan    ASA: 2  Anesthesia type: total IV anesthesia and MAC          Induction: Intravenous  Anesthetic plan and risks discussed with: Patient

## 2020-06-12 NOTE — PERIOP NOTES
Endoscope was pre-cleaned at bedside immediately following procedure by [facility], ET. Glasses returned to patient post procure.     Medications     Medication Rate/Dose/Volume Action Route Date Time   Administering User Audit    lidocaine (PF) 2% (mg) 40 mg Given IntraVENous 06/12/20  0928  Pedro Hurst DO     propofol 10 mg/mL (mg) 200 mg Given IntraVENous 06/12/20  0950  Pedro Hurst DO      Comment:  Titrated over case        0.9% sodium chloride infusion (mL) 100 mL/hr New Bag IntraVENous 06/12/20  0930  Josefina Stephen edited     350 mL Anesthesia Volume Adjustment IntraVENous  4972  Nia PERRY DO

## 2020-06-12 NOTE — ANESTHESIA POSTPROCEDURE EVALUATION
Procedure(s):  COLONOSCOPY  ENDOSCOPIC POLYPECTOMY. total IV anesthesia    Anesthesia Post Evaluation        Patient location during evaluation: PACU  Note status: Adequate. Level of consciousness: responsive to verbal stimuli and sleepy but conscious  Pain management: satisfactory to patient  Airway patency: patent  Anesthetic complications: no  Cardiovascular status: acceptable  Respiratory status: acceptable  Hydration status: acceptable  Comments: +Post-Anesthesia Evaluation and Assessment    Patient: Pearl Flores MRN: [id number]  SSN: [id number]   YOB: 1950  Age: 69 y.o. Sex: female      Cardiovascular Function/Vital Signs    /87   Pulse 79   Temp 36.8 °C (98.2 °F)   Resp 17   SpO2 97%   Breastfeeding No     Patient is status post Procedure(s):  COLONOSCOPY  ENDOSCOPIC POLYPECTOMY. Nausea/Vomiting: Controlled. Postoperative hydration reviewed and adequate. Pain:  Pain Scale 1: Numeric (0 - 10) (06/12/20 1019)  Pain Intensity 1: 0 (06/12/20 1019)   Managed. Neurological Status: At baseline. Mental Status and Level of Consciousness: Arousable. Pulmonary Status:   O2 Device: Room air (06/12/20 1019)   Adequate oxygenation and airway patent. Complications related to anesthesia: None    Post-anesthesia assessment completed. No concerns.     Signed By: Tiesha Rucker DO    6/12/2020  Post anesthesia nausea and vomiting:  controlled      INITIAL Post-op Vital signs:   Vitals Value Taken Time   /87 6/12/2020 10:19 AM   Temp 36.8 °C (98.2 °F) 6/12/2020 10:07 AM   Pulse 79 6/12/2020 10:19 AM   Resp 17 6/12/2020 10:19 AM   SpO2 97 % 6/12/2020 10:19 AM

## 2020-07-14 ENCOUNTER — HOSPITAL ENCOUNTER (OUTPATIENT)
Dept: MRI IMAGING | Age: 70
Discharge: HOME | End: 2020-07-14
Attending: PHYSICIAN ASSISTANT
Payer: MEDICARE

## 2020-07-14 DIAGNOSIS — M51.36 DDD (DEGENERATIVE DISC DISEASE), LUMBAR: ICD-10-CM

## 2020-07-14 DIAGNOSIS — M54.16 LUMBAR RADICULOPATHY: ICD-10-CM

## 2020-07-14 DIAGNOSIS — M47.816 FACET ARTHROPATHY, LUMBAR: ICD-10-CM

## 2020-07-14 DIAGNOSIS — M41.50 DEGENERATIVE SCOLIOSIS: ICD-10-CM

## 2020-07-14 DIAGNOSIS — M54.9 MECHANICAL BACK PAIN: ICD-10-CM

## 2020-07-14 PROCEDURE — 72148 MRI LUMBAR SPINE W/O DYE: CPT

## 2020-07-15 NOTE — PROGRESS NOTES
Check up for history of endometrial cancer. Pt states no abnormal spotting, bleeding or pain. 1. Have you been to the ER, urgent care clinic since your last visit? Hospitalized since your last visit?no    2. Have you seen or consulted any other health care providers outside of the [address] [place] [address] since your last visit? Include any pap smears or colon screening.  no

## 2020-07-16 ENCOUNTER — OFFICE VISIT (OUTPATIENT)
Dept: GYNECOLOGY | Age: 70
End: 2020-07-16

## 2020-07-16 VITALS
HEIGHT: 63 IN | BODY MASS INDEX: 31.68 KG/M2 | HEART RATE: 76 BPM | SYSTOLIC BLOOD PRESSURE: 150 MMHG | DIASTOLIC BLOOD PRESSURE: 89 MMHG | WEIGHT: 178.8 LBS

## 2020-07-16 DIAGNOSIS — C54.9 MALIGNANT NEOPLASM OF CORPUS UTERI, EXCEPT ISTHMUS (HCC): Primary | ICD-10-CM

## 2020-07-16 RX ORDER — POTASSIUM CHLORIDE 750 MG/1
TABLET, FILM COATED, EXTENDED RELEASE ORAL
COMMUNITY
Start: 2020-06-17

## 2020-07-16 NOTE — PROGRESS NOTES
[facility]YNECOLOGIC ONCOLOGY  [address] [facility], [address] [facility]  [phone] [facility] [phone]      Patient ID:  Jami Orlando  [id number]  1950/70 y.o. Visit date: 7/16/2020    INTERVAL HISTORY:  Jami Orlando is a  female with a history of stage IA, grade 1 endometrial cancer diagnosed in August 2010. She underwent a laparoscopic hysterectomy with staging and required no adjuvant therapy based upon the favorable pathology. She had a repair of a cystocele and rectocele in early 2018. She presents today for routine surveillance. She is doing well and without complaints.         Past Medical History:   Diagnosis Date    Arrhythmia     PVC's    Arthritis     Spine    Cancer ([facility] 2010    Uternine - hysterectomy - no treatments required    Contact dermatitis and other eczema, due to unspecified cause     roseacea    GERD (gastroesophageal reflux disease)     Hypercholesterolemia     Hypertension     Other ill-defined conditions(799.89)     Nasal Allergies    Other ill-defined conditions(799.89)     Tendonitis in Left Knee    Other ill-defined conditions(799.89)     cancer uterine    Tendonitis of shoulder 2/15    left shoulder    Vertigo        Past Surgical History:   Procedure Laterality Date    COLONOSCOPY N/A 6/12/2020    COLONOSCOPY performed by Otilia Garg MD at [address] [place] [address]  6/12/2020         COLORECTAL SCRN; HI RISK IND  6/12/2020         ENDOSCOPY, COLON, DIAGNOSTIC  2008    HX CHOLECYSTECTOMY  1998    HX DILATION AND CURETTAGE  2006, 1974, 1973    HX HEENT  12/14    root canal    HX HYSTERECTOMY  8/24/10    TLH/BSO/Lap    HX OOPHORECTOMY Bilateral     HX TUBAL LIGATION      HX UROLOGICAL  02/2018    cystocele/rectalcele     HX UROLOGICAL  2017    bladder tact       Social History     Socioeconomic History    Marital status:      Spouse name: Not on file    Number of children: Not on file  Years of education: Not on file    Highest education level: Not on file   Occupational History    Not on file   Social Needs    Financial resource strain: Not on file    Food insecurity     Worry: Not on file     Inability: Not on file    Transportation needs     Medical: Not on file     Non-medical: Not on file   Tobacco Use    Smoking status: Former Smoker     Last attempt to quit: 1991     Years since quittin.5    Smokeless tobacco: Former User   Substance and Sexual Activity    Alcohol use: No    Drug use: No    Sexual activity: Yes     Partners: Male     Birth control/protection: None   Lifestyle    Physical activity     Days per week: Not on file     Minutes per session: Not on file    Stress: Not on file   Relationships    Social connections     Talks on phone: Not on file     Gets together: Not on file     Attends Shinto service: Not on file     Active member of club or organization: Not on file     Attends meetings of clubs or organizations: Not on file     Relationship status: Not on file    Intimate partner violence     Fear of current or ex partner: Not on file     Emotionally abused: Not on file     Physically abused: Not on file     Forced sexual activity: Not on file   Other Topics Concern    Not on file   Social History Narrative    Not on file       Family History   Problem Relation Age of Onset    Hypertension Mother     Stroke Mother     Diabetes Mother     Stroke Father     Stroke Brother     Diabetes Brother     Hypertension Brother     Cancer Maternal Uncle         colon cancer    Cancer Maternal Uncle         colon cancer       Current Outpatient Medications on File Prior to Visit   Medication Sig Dispense Refill    cholecalciferol (VITAMIN D3) 1,000 unit tablet Take  by mouth daily.  guaiFENesin ER (MUCINEX) 600 mg ER tablet Take 600 mg by mouth two (2) times a day.  esomeprazole (NEXIUM) 40 mg capsule Take  by mouth daily.        Margie Brooke 0.3 mg/0.3 mL injection       carvedilol (COREG) 25 mg tablet Take 25 mg by mouth two (2) times daily (with meals).  cetirizine (ZYRTEC) 10 mg tablet Take 10 mg by mouth daily.  indapamide (LOZOL) 2.5 mg tablet Take 2.5 mg by mouth daily.  fluticasone (FLONASE) 50 mcg/Actuation nasal spray 2 Sprays by Nasal route daily.  multivitamins-ca-iron-minerals (ONE-A-DAY WOMENS FORMULA) 27-0.4 mg Tab Take 1 Tab by mouth daily.  potassium chloride SR (KLOR-CON 10) 10 mEq tablet TAKE 2 TABLETS BY MOUTH ONCE DAILY       No current facility-administered medications on file prior to visit.         Allergies   Allergen Reactions    Food Extracts Anaphylaxis     Jalapeno Peppers:  Numbness and Swelling    Azithromycin Itching    Bactrim [Sulfamethoxazole-Trimethoprim] Rash    Clindamycin Hives and Itching    Diclofenac Diarrhea    Doxepin Other (comments)     Tachycardia      Erythromycin Hives and Itching    Medrol [Methylprednisolone] Itching    Metronidazole Hives, Itching and Nausea and Vomiting    Morphine Nausea and Vomiting     Severe vomitting    Penicillins Rash    Seldane Palpitations     Memory loss    Vioxx [Rofecoxib] Other (comments)     Tongue swells, facial numbeness       ROS:  Negative    OBJECTIVE:  PHYSICAL EXAM  VITAL SIGNS: Visit Vitals  /89 (BP 1 Location: Right arm, BP Patient Position: Sitting)   Pulse 76   Ht 5' 2.99\" (1.6 m)   Wt 178 lb 12.8 oz (81.1 kg)   BMI 31.68 kg/m²      GENERAL FABIO: in no apparent distress and well developed and well nourished   HEENT: within normal limits   RESPIRATORY: lungs clear to auscultation   CARDIOVASC: Regular rate and rhythm   GASTROINT: soft, non-tender, without masses or organomegaly   MUSCULOSKEL: no joint tenderness, deformity or swelling   INTEGUMENT: no rash or abnormalities   EXTREMITIES: extremities normal, atraumatic, no cyanosis or edema   PELVIC: External genitalia: normal general appearance  Vaginal: normal without tenderness, induration or masses  Uterus: absent   RECTAL: rectal exam not indicated   RITCHIE SURVEY: Cervical, supraclavicular, and axillary nodes normal.   NEURO: Grossly normal       IMPRESSION AND PLAN:  Fidelia Singh has a history of stage I, grade 1 endometrial cancer. She has no evidence of disease on exam.  We will plan on seeing her back in one year for further surveillance.       José Miguel Mc MD  7/16/2020/2:44 PM

## 2020-08-07 ENCOUNTER — HOSPITAL ENCOUNTER (OUTPATIENT)
Dept: MAMMOGRAPHY | Age: 70
Discharge: HOME | End: 2020-08-07
Attending: INTERNAL MEDICINE
Payer: MEDICARE

## 2020-08-07 DIAGNOSIS — Z12.31 ENCOUNTER FOR MAMMOGRAM TO ESTABLISH BASELINE MAMMOGRAM: ICD-10-CM

## 2020-08-07 PROCEDURE — 77067 SCR MAMMO BI INCL CAD: CPT

## 2020-08-14 ENCOUNTER — HOSPITAL ENCOUNTER (OUTPATIENT)
Dept: MAMMOGRAPHY | Age: 70
Discharge: HOME | End: 2020-08-14
Attending: INTERNAL MEDICINE
Payer: MEDICARE

## 2020-08-14 ENCOUNTER — HOSPITAL ENCOUNTER (OUTPATIENT)
Dept: ULTRASOUND IMAGING | Age: 70
Discharge: HOME | End: 2020-08-14
Attending: INTERNAL MEDICINE
Payer: MEDICARE

## 2020-08-14 DIAGNOSIS — R92.8 ABNORMAL MAMMOGRAM: ICD-10-CM

## 2020-08-14 PROCEDURE — 76642 ULTRASOUND BREAST LIMITED: CPT

## 2020-08-14 PROCEDURE — 77065 DX MAMMO INCL CAD UNI: CPT

## 2020-09-03 NOTE — ROUTINE PROCESS
Have you been tested for COVID-19 in the past 7 days? No    Do you have a personal history of COVID-19 within the past 28 days? No  If Yes, What was the method of testing: clinical assumption or test result? Have you had close contact with a known to be positive COVID-19 patient within the past 14 days? 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 No    Do you live in a SNF, adult home or other institutional setting? No   If Yes, have they experienced a flood of COVID-19 positive patients?     In the past 2-14 days have you had any of the following symptoms    Cough  No   New onset Shortness of breath or difficulty breathing  No    Or at least two of these symptoms:   Fever greater than 100 F  No   Chills  No   Repeated shaking with chills  No   Muscle pain  No   Headache  No   Sore throat  No   New loss of taste or smell  No   New onset diarrhea  No

## 2020-09-04 ENCOUNTER — HOSPITAL ENCOUNTER (OUTPATIENT)
Dept: INTERVENTIONAL RADIOLOGY/VASCULAR | Age: 70
Discharge: HOME | End: 2020-09-04
Attending: PHYSICIAN ASSISTANT
Payer: MEDICARE

## 2020-09-04 VITALS
RESPIRATION RATE: 20 BRPM | BODY MASS INDEX: 33.13 KG/M2 | SYSTOLIC BLOOD PRESSURE: 148 MMHG | HEART RATE: 86 BPM | DIASTOLIC BLOOD PRESSURE: 75 MMHG | TEMPERATURE: 98.3 F | HEIGHT: 62 IN | WEIGHT: 180 LBS

## 2020-09-04 DIAGNOSIS — M48.062 LUMBAR STENOSIS WITH NEUROGENIC CLAUDICATION: ICD-10-CM

## 2020-09-04 DIAGNOSIS — M51.26 HNP (HERNIATED NUCLEUS PULPOSUS), LUMBAR: ICD-10-CM

## 2020-09-04 DIAGNOSIS — M47.816 FACET ARTHROPATHY, LUMBAR: ICD-10-CM

## 2020-09-04 DIAGNOSIS — M54.16 LUMBAR RADICULOPATHY: ICD-10-CM

## 2020-09-04 DIAGNOSIS — M51.36 DDD (DEGENERATIVE DISC DISEASE), LUMBAR: ICD-10-CM

## 2020-09-04 DIAGNOSIS — M41.50 DEGENERATIVE SCOLIOSIS: ICD-10-CM

## 2020-09-04 PROCEDURE — 74011000250 HC RX REV CODE- 250: Performed by: RADIOLOGY

## 2020-09-04 PROCEDURE — 74011250636 HC RX REV CODE- 250/636: Performed by: RADIOLOGY

## 2020-09-04 PROCEDURE — 64483 NJX AA&/STRD TFRM EPI L/S 1: CPT

## 2020-09-04 PROCEDURE — 74011000636 HC RX REV CODE- 636: Performed by: RADIOLOGY

## 2020-09-04 PROCEDURE — 74011000250 HC RX REV CODE- 250

## 2020-09-04 RX ORDER — LIDOCAINE HYDROCHLORIDE 10 MG/ML
2 INJECTION, SOLUTION EPIDURAL; INFILTRATION; INTRACAUDAL; PERINEURAL ONCE
Status: COMPLETED | OUTPATIENT
Start: 2020-09-04 | End: 2020-09-04

## 2020-09-04 RX ORDER — LIDOCAINE HYDROCHLORIDE 20 MG/ML
INJECTION, SOLUTION INFILTRATION; PERINEURAL
Status: COMPLETED
Start: 2020-09-04 | End: 2020-09-04

## 2020-09-04 RX ORDER — LIDOCAINE HYDROCHLORIDE 20 MG/ML
18 INJECTION, SOLUTION INFILTRATION; PERINEURAL ONCE
Status: COMPLETED | OUTPATIENT
Start: 2020-09-04 | End: 2020-09-04

## 2020-09-04 RX ORDER — DEXAMETHASONE SODIUM PHOSPHATE 10 MG/ML
10 INJECTION INTRAMUSCULAR; INTRAVENOUS ONCE
Status: COMPLETED | OUTPATIENT
Start: 2020-09-04 | End: 2020-09-04

## 2020-09-04 RX ADMIN — DEXAMETHASONE SODIUM PHOSPHATE 10 MG: 10 INJECTION, SOLUTION INTRAMUSCULAR; INTRAVENOUS at 14:15

## 2020-09-04 RX ADMIN — LIDOCAINE HYDROCHLORIDE 200 MG: 20 INJECTION, SOLUTION INFILTRATION; PERINEURAL at 14:00

## 2020-09-04 RX ADMIN — IOPAMIDOL 2 ML: 408 INJECTION, SOLUTION INTRATHECAL at 14:15

## 2020-09-04 RX ADMIN — LIDOCAINE HYDROCHLORIDE 2 ML: 10 INJECTION, SOLUTION EPIDURAL; INFILTRATION; INTRACAUDAL; PERINEURAL at 14:15

## 2020-09-04 NOTE — DISCHARGE INSTRUCTIONS
[address] [facility]  Special Procedures/Radiology Department      Steroidal Injection      Go home and rest.     No vigorous physical activity today. Be aware that numbness and/or tingling can occur up to 24 hours after the injection. No driving today. Resume your previous diet. Resume your previous medications. Depending on your job, you may return to work in 25 to 48 hours. It may take up to one week after the injection to see a change or an improvement in your symptoms. Be sure to follow up with your physician. Tell your physician if the injection helped with your symptoms or if the injection did nothing for your symptoms. For minor discomfort, you can take Tylenol, as directed on the label.       If you have any questions or concerns, please call [phone] and ask for the nurse on-call

## 2020-09-04 NOTE — ROUTINE PROCESS
I have reviewed discharge instructions with the patient. The patient verbalized understanding. Copy of discharge instructions per AVS copied, reviewed with pt., signature sheet signed and sent to [facility]. Rec. For scanning along with copy of instructions to pt. Prior to discharge. Pt in w/c post procedure for discharge to home via private vehicle with her  driving her home. Pt accomp. By nurse to car for discharge. Pt states her right leg feels \"a little numb\" but states she can be discharged and states her  will help her when she gets home. Pt bandaid noted clean/dry/intact without bleeding, swelling or pain.

## 2021-07-12 NOTE — PROGRESS NOTES
Check up for history of endometrial cancer. Pt states no abnormal spotting, bleeding or pain. 1. Have you been to the ER, urgent care clinic since your last visit? Hospitalized since your last visit?no    2. Have you seen or consulted any other health care providers outside of the [address] [place] [address] since your last visit? Include any pap smears or colon screening.    no

## 2021-07-13 ENCOUNTER — OFFICE VISIT (OUTPATIENT)
Dept: GYNECOLOGY | Age: 71
End: 2021-07-13
Payer: MEDICARE

## 2021-07-13 VITALS
BODY MASS INDEX: 33.06 KG/M2 | HEART RATE: 77 BPM | HEIGHT: 63 IN | DIASTOLIC BLOOD PRESSURE: 90 MMHG | SYSTOLIC BLOOD PRESSURE: 155 MMHG | WEIGHT: 186.6 LBS

## 2021-07-13 DIAGNOSIS — C54.9 MALIGNANT NEOPLASM OF CORPUS UTERI, EXCEPT ISTHMUS (HCC): Primary | ICD-10-CM

## 2021-07-13 PROCEDURE — G8427 DOCREV CUR MEDS BY ELIG CLIN: HCPCS | Performed by: OBSTETRICS & GYNECOLOGY

## 2021-07-13 PROCEDURE — 99213 OFFICE O/P EST LOW 20 MIN: CPT | Performed by: OBSTETRICS & GYNECOLOGY

## 2021-07-13 PROCEDURE — G8400 PT W/DXA NO RESULTS DOC: HCPCS | Performed by: OBSTETRICS & GYNECOLOGY

## 2021-07-13 PROCEDURE — G0463 HOSPITAL OUTPT CLINIC VISIT: HCPCS | Performed by: OBSTETRICS & GYNECOLOGY

## 2021-07-13 PROCEDURE — 1101F PT FALLS ASSESS-DOCD LE1/YR: CPT | Performed by: OBSTETRICS & GYNECOLOGY

## 2021-07-13 PROCEDURE — G9899 SCRN MAM PERF RSLTS DOC: HCPCS | Performed by: OBSTETRICS & GYNECOLOGY

## 2021-07-13 PROCEDURE — 1090F PRES/ABSN URINE INCON ASSESS: CPT | Performed by: OBSTETRICS & GYNECOLOGY

## 2021-07-13 PROCEDURE — G8510 SCR DEP NEG, NO PLAN REQD: HCPCS | Performed by: OBSTETRICS & GYNECOLOGY

## 2021-07-13 PROCEDURE — 3017F COLORECTAL CA SCREEN DOC REV: CPT | Performed by: OBSTETRICS & GYNECOLOGY

## 2021-07-13 PROCEDURE — G8417 CALC BMI ABV UP PARAM F/U: HCPCS | Performed by: OBSTETRICS & GYNECOLOGY

## 2021-07-13 PROCEDURE — G8536 NO DOC ELDER MAL SCRN: HCPCS | Performed by: OBSTETRICS & GYNECOLOGY

## 2021-07-13 RX ORDER — ESTRADIOL 0.1 MG/G
2 CREAM VAGINAL DAILY
COMMUNITY

## 2021-07-13 NOTE — PROGRESS NOTES
[facility] GYNECOLOGIC ONCOLOGY  [address] [facility], [address], 140[address] [facility]  [phone] [facility] [phone]      Patient ID:  Alix Parada  [id number]  1950/71 y.o. Visit date: 7/13/2021    INTERVAL HISTORY:  Alix Parada is a  female who is an established patient with a history of stage IA, grade 1 endometrial cancer diagnosed in August 2010. She underwent a laparoscopic hysterectomy with staging and required no adjuvant therapy based upon the favorable pathology. She had a repair of a cystocele and rectocele in early 2018. She presents today for routine surveillance. She is doing well and without complaints.         Past Medical History:   Diagnosis Date    Arrhythmia     PVC's    Arthritis     Spine    Cancer ([facility] [address] 2010    Uternine - hysterectomy - no treatments required    Contact dermatitis and other eczema, due to unspecified cause     roseacea    GERD (gastroesophageal reflux disease)     Hypercholesterolemia     Hypertension     Menopause     Other ill-defined conditions(799.89)     Nasal Allergies    Other ill-defined conditions(799.89)     Tendonitis in Left Knee    Other ill-defined conditions(799.89)     cancer uterine    Tendonitis of shoulder 2/15    left shoulder    Vertigo        Past Surgical History:   Procedure Laterality Date    COLONOSCOPY N/A 6/12/2020    COLONOSCOPY performed by Lorenzo Baeza MD at [address] [place] [address]  6/12/2020         COLORECTAL SCRN; HI RISK IND  6/12/2020         ENDOSCOPY, COLON, DIAGNOSTIC  2008    HX CHOLECYSTECTOMY  1998    HX DILATION AND CURETTAGE  2006, 1974, 1973    HX HEENT  12/14    root canal    HX HYSTERECTOMY  8/24/10    TLH/BSO/Lap    HX OOPHORECTOMY Bilateral     HX TUBAL LIGATION      HX UROLOGICAL  02/2018    cystocele/rectalcele     HX UROLOGICAL  2017    bladder tact    IR INJ FORAMIN EPID LUMB ANES/STER SNGL  9/4/2020       Social History     Socioeconomic History    Marital status:      Spouse name: Not on file    Number of children: Not on file    Years of education: Not on file    Highest education level: Not on file   Occupational History    Not on file   Tobacco Use    Smoking status: Former Smoker     Quit date: 1991     Years since quittin.5    Smokeless tobacco: Former User   Substance and Sexual Activity    Alcohol use: No    Drug use: No    Sexual activity: Yes     Partners: Male     Birth control/protection: None   Other Topics Concern    Not on file   Social History Narrative    Not on file     Social Determinants of Health     Financial Resource Strain:     Difficulty of Paying Living Expenses:    Food Insecurity:     Worried About [address] in the Last Year:     [address] in the Last Year:    Transportation Needs:     Lack of Transportation (Medical):  Lack of Transportation (Non-Medical):    Physical Activity:     Days of Exercise per Week:     Minutes of Exercise per Session:    Stress:     Feeling of Stress :    Social Connections:     Frequency of Communication with Friends and Family:     Frequency of Social Gatherings with Friends and Family:     Attends Religion Services:     Active Member of Clubs or Organizations:     Attends Club or Organization Meetings:     Marital Status:    Intimate Partner Violence:     Fear of Current or Ex-Partner:     Emotionally Abused:     Physically Abused:     Sexually Abused:        Family History   Problem Relation Age of Onset    Hypertension Mother     Stroke Mother     Diabetes Mother     Stroke Father     Stroke Brother     Diabetes Brother     Hypertension Brother     Cancer Maternal Uncle         colon cancer    Cancer Maternal Uncle         colon cancer       Current Outpatient Medications on File Prior to Visit   Medication Sig Dispense Refill    estradioL (Estrace) 0.01 % (0.1 mg/gram) vaginal cream Insert 2 g into vagina daily.  potassium chloride SR (KLOR-CON 10) 10 mEq tablet TAKE 2 TABLETS BY MOUTH ONCE DAILY      cholecalciferol (VITAMIN D3) 1,000 unit tablet Take  by mouth daily.  guaiFENesin ER (MUCINEX) 600 mg ER tablet Take 600 mg by mouth two (2) times a day.  esomeprazole (NEXIUM) 40 mg capsule Take  by mouth daily.  EPIPEN 0.3 mg/0.3 mL injection       carvedilol (COREG) 25 mg tablet Take 25 mg by mouth two (2) times daily (with meals).  cetirizine (ZYRTEC) 10 mg tablet Take 10 mg by mouth daily.  indapamide (LOZOL) 2.5 mg tablet Take 2.5 mg by mouth daily.  fluticasone (FLONASE) 50 mcg/Actuation nasal spray 2 Sprays by Nasal route daily.  multivitamins-ca-iron-minerals (ONE-A-DAY WOMENS FORMULA) 27-0.4 mg Tab Take 1 Tab by mouth daily. No current facility-administered medications on file prior to visit.        Allergies   Allergen Reactions    Food Extracts Anaphylaxis     Jalapeno Peppers:  Numbness and Swelling  Pt also has allergies to cinnamon, black pepper, paprika, chili peppers    Azithromycin Itching    Bactrim [Sulfamethoxazole-Trimethoprim] Rash    Ciprofloxacin Itching    Clindamycin Hives and Itching    Diclofenac Diarrhea    Doxepin Other (comments)     Tachycardia      Erythromycin Hives and Itching    Macrobid [Nitrofurantoin Monohyd/M-Cryst] Itching    Medrol [Methylprednisolone] Itching    Meloxicam Itching    Metronidazole Hives, Itching and Nausea and Vomiting    Morphine Nausea and Vomiting     Severe vomitting    Penicillins Rash    Seldane Palpitations     Memory loss    Vioxx [Rofecoxib] Other (comments)     Tongue swells, facial numbeness       ROS:  Negative    OBJECTIVE:  PHYSICAL EXAM  VITAL SIGNS: Visit Vitals  BP (!) 155/90 (BP 1 Location: Right upper arm, BP Patient Position: Sitting)   Pulse 77   Ht 5' 2.99\" (1.6 m)   Wt 186 lb 9.6 oz (84.6 kg)   BMI 33.06 kg/m²      GENERAL FABIO: in no apparent distress and well developed and well nourished   HEENT: within normal limits   RESPIRATORY: lungs clear to auscultation   CARDIOVASC: Regular rate and rhythm   GASTROINT: soft, non-tender, without masses or organomegaly   MUSCULOSKEL: no joint tenderness, deformity or swelling   INTEGUMENT: no rash or abnormalities   EXTREMITIES: extremities normal, atraumatic, no cyanosis or edema   PELVIC: External genitalia: normal general appearance  Vaginal: normal without tenderness, induration or masses  Uterus: absent   RECTAL: rectal exam not indicated   RITCHIE SURVEY: Cervical, supraclavicular, and axillary nodes normal.   NEURO: Grossly normal       IMPRESSION AND PLAN:  Deborah Dhillon has a history of stage I, grade 1 endometrial cancer. She has no evidence of disease on exam.  We will plan on seeing her back in one year for further surveillance. An electronic signature was used to sign this note.     Lemuel Vazquez MD  07/13/21

## 2021-07-29 ENCOUNTER — TRANSCRIBE ORDER (OUTPATIENT)
Dept: SCHEDULING | Age: 71
End: 2021-07-29

## 2021-07-29 DIAGNOSIS — Z12.31 SCREENING MAMMOGRAM FOR HIGH-RISK PATIENT: Primary | ICD-10-CM

## 2021-08-19 ENCOUNTER — HOSPITAL ENCOUNTER (OUTPATIENT)
Dept: MAMMOGRAPHY | Age: 71
Discharge: HOME | End: 2021-08-19
Attending: INTERNAL MEDICINE
Payer: MEDICARE

## 2021-08-19 DIAGNOSIS — Z12.31 SCREENING MAMMOGRAM FOR HIGH-RISK PATIENT: ICD-10-CM

## 2021-08-19 PROCEDURE — 77067 SCR MAMMO BI INCL CAD: CPT

## 2022-07-18 NOTE — PROGRESS NOTES
Check up. Pt states no abnormal spotting, bleeding or pain. 1. Have you been to the ER, urgent care clinic since your last visit? Hospitalized since your last visit?no    2. Have you seen or consulted any other health care providers outside of the [address] [place] [address] since your last visit? Include any pap smears or colon screening.    no

## 2022-07-19 ENCOUNTER — OFFICE VISIT (OUTPATIENT)
Dept: GYNECOLOGY | Age: 72
End: 2022-07-19
Payer: MEDICARE

## 2022-07-19 VITALS
SYSTOLIC BLOOD PRESSURE: 131 MMHG | BODY MASS INDEX: 31.96 KG/M2 | HEART RATE: 79 BPM | WEIGHT: 180.4 LBS | DIASTOLIC BLOOD PRESSURE: 87 MMHG | HEIGHT: 63 IN

## 2022-07-19 DIAGNOSIS — C54.9 MALIGNANT NEOPLASM OF CORPUS UTERI, EXCEPT ISTHMUS (HCC): Primary | ICD-10-CM

## 2022-07-19 PROCEDURE — 1101F PT FALLS ASSESS-DOCD LE1/YR: CPT | Performed by: OBSTETRICS & GYNECOLOGY

## 2022-07-19 PROCEDURE — G9899 SCRN MAM PERF RSLTS DOC: HCPCS | Performed by: OBSTETRICS & GYNECOLOGY

## 2022-07-19 PROCEDURE — G8427 DOCREV CUR MEDS BY ELIG CLIN: HCPCS | Performed by: OBSTETRICS & GYNECOLOGY

## 2022-07-19 PROCEDURE — G8536 NO DOC ELDER MAL SCRN: HCPCS | Performed by: OBSTETRICS & GYNECOLOGY

## 2022-07-19 PROCEDURE — G8400 PT W/DXA NO RESULTS DOC: HCPCS | Performed by: OBSTETRICS & GYNECOLOGY

## 2022-07-19 PROCEDURE — 3017F COLORECTAL CA SCREEN DOC REV: CPT | Performed by: OBSTETRICS & GYNECOLOGY

## 2022-07-19 PROCEDURE — 1090F PRES/ABSN URINE INCON ASSESS: CPT | Performed by: OBSTETRICS & GYNECOLOGY

## 2022-07-19 PROCEDURE — 99213 OFFICE O/P EST LOW 20 MIN: CPT | Performed by: OBSTETRICS & GYNECOLOGY

## 2022-07-19 PROCEDURE — G0463 HOSPITAL OUTPT CLINIC VISIT: HCPCS | Performed by: OBSTETRICS & GYNECOLOGY

## 2022-07-19 PROCEDURE — G8432 DEP SCR NOT DOC, RNG: HCPCS | Performed by: OBSTETRICS & GYNECOLOGY

## 2022-07-19 PROCEDURE — G8417 CALC BMI ABV UP PARAM F/U: HCPCS | Performed by: OBSTETRICS & GYNECOLOGY

## 2022-07-19 PROCEDURE — 1123F ACP DISCUSS/DSCN MKR DOCD: CPT | Performed by: OBSTETRICS & GYNECOLOGY

## 2022-07-19 NOTE — PROGRESS NOTES
[facility] GYNECOLOGIC ONCOLOGY  [address] [facility], [facility] [address] [facility]  [phone] [facility] [phone]      Patient ID:  Rafael Castillo  [id number]  1950/72 y.o. Visit date: 7/19/2022    INTERVAL HISTORY:  Rafael Castillo is a  female who is an established patient with a history of stage IA, grade 1 endometrial cancer diagnosed in August 2010. She underwent a laparoscopic hysterectomy with staging and required no adjuvant therapy based upon the favorable pathology. She had a repair of a cystocele and rectocele in early 2018. She presents today for routine surveillance. She is doing well and without complaints.         Past Medical History:   Diagnosis Date    Arrhythmia     PVC's    Arthritis     Spine    [facility] [address] [address] 2010    Uternine - hysterectomy - no treatments required    Contact dermatitis and other eczema, due to unspecified cause     roseacea    GERD (gastroesophageal reflux disease)     Hypercholesterolemia     Hypertension     Menopause     Other ill-defined conditions(799.89)     Nasal Allergies    Other ill-defined conditions(799.89)     Tendonitis in Left Knee    Other ill-defined conditions(799.89)     cancer uterine    Tendonitis of shoulder 2/15    left shoulder    Vertigo        Past Surgical History:   Procedure Laterality Date    COLONOSCOPY N/A 6/12/2020    COLONOSCOPY performed by Dale Ramirez MD at [address] [place] [address]  6/12/2020         COLORECTAL SCRN; HI RISK IND  6/12/2020         ENDOSCOPY, COLON, DIAGNOSTIC  2008    HX CHOLECYSTECTOMY  1998    HX DILATION AND CURETTAGE  2006, 1974, 1973    HX HEENT  12/14    root canal    HX HYSTERECTOMY  8/24/10    TLH/BSO/Lap    HX OOPHORECTOMY Bilateral     HX TUBAL LIGATION      HX UROLOGICAL  02/2018    cystocele/rectalcele     HX UROLOGICAL  2017    bladder tact    IR INJ FORAMIN EPID LUMB ANES/STER SNGL  9/4/2020       Social History     Socioeconomic History    Marital status:      Spouse name: Not on file    Number of children: Not on file    Years of education: Not on file    Highest education level: Not on file   Occupational History    Not on file   Tobacco Use    Smoking status: Former Smoker     Quit date: 1991     Years since quittin.5    Smokeless tobacco: Former User   Substance and Sexual Activity    Alcohol use: No    Drug use: No    Sexual activity: Yes     Partners: Male     Birth control/protection: None   Other Topics Concern    Not on file   Social History Narrative    Not on file     Social Determinants of Health     Financial Resource Strain:     Difficulty of Paying Living Expenses: Not on file   Food Insecurity:     Worried About [address] in the Last Year: Not on file    Saur[employer] in the Last Year: Not on file   Transportation Needs:     Lack of Transportation (Medical): Not on file    Lack of Transportation (Non-Medical):  Not on file   Physical Activity:     Days of Exercise per Week: Not on file    Minutes of Exercise per Session: Not on file   Stress:     Feeling of Stress : Not on file   Social Connections:     Frequency of Communication with Friends and Family: Not on file    Frequency of Social Gatherings with Friends and Family: Not on file    Attends [employer] Services: Not on file    Active Member of [address] [place] [address] [employer] or Organizations: Not on file    Attends Club or Organization Meetings: Not on file    Marital Status: Not on file   Intimate Partner Violence:     Fear of Current or Ex-Partner: Not on file    Emotionally Abused: Not on file    Physically Abused: Not on file    Sexually Abused: Not on file   Housing Stability:     Unable to Pay for Housing in the Last Year: Not on file    Number of [place] in the Last Year: Not on file    Unstable Housing in the Last Year: Not on file       Family History   Problem Relation Age of Onset    Hypertension Mother     Stroke Mother     Diabetes Mother     Stroke Father     Stroke Brother     Diabetes Brother     Hypertension Brother     Cancer Maternal Uncle         colon cancer    Cancer Maternal Uncle         colon cancer       Current Outpatient Medications on File Prior to Visit   Medication Sig Dispense Refill    estradioL (Estrace) 0.01 % (0.1 mg/gram) vaginal cream Insert 2 g into vagina daily.  potassium chloride SR (KLOR-CON 10) 10 mEq tablet TAKE 2 TABLETS BY MOUTH ONCE DAILY      cholecalciferol (VITAMIN D3) 1,000 unit tablet Take  by mouth daily.  guaiFENesin ER (MUCINEX) 600 mg ER tablet Take 600 mg by mouth two (2) times a day.  esomeprazole (NEXIUM) 40 mg capsule Take  by mouth daily.  EPIPEN 0.3 mg/0.3 mL injection       carvedilol (COREG) 25 mg tablet Take 25 mg by mouth two (2) times daily (with meals).  cetirizine (ZYRTEC) 10 mg tablet Take 10 mg by mouth daily.  indapamide (LOZOL) 2.5 mg tablet Take 2.5 mg by mouth daily.  fluticasone (FLONASE) 50 mcg/Actuation nasal spray 2 Sprays by Nasal route daily.  multivitamins-ca-iron-minerals (ONE-A-DAY WOMENS FORMULA) 27-0.4 mg Tab Take 1 Tab by mouth daily. No current facility-administered medications on file prior to visit.        Allergies   Allergen Reactions    Food Extracts Anaphylaxis     Jalapeno Peppers:  Numbness and Swelling  Pt also has allergies to cinnamon, black pepper, paprika, chili peppers    Azithromycin Itching    Bactrim [Sulfamethoxazole-Trimethoprim] Rash    Ciprofloxacin Itching    Clindamycin Hives and Itching    Diclofenac Diarrhea    Doxepin Other (comments)     Tachycardia      Erythromycin Hives and Itching    Macrobid [Nitrofurantoin Monohyd/M-Cryst] Itching    Medrol [Methylprednisolone] Itching    Meloxicam Itching    Metronidazole Hives, Itching and Nausea and Vomiting    Morphine Nausea and Vomiting     Severe vomitting    Penicillins Rash    Seldane Palpitations Memory loss    Vioxx [Rofecoxib] Other (comments)     Tongue swells, facial numbeness       ROS:  Negative    OBJECTIVE:  PHYSICAL EXAM  VITAL SIGNS: Visit Vitals  /87 (BP 1 Location: Right upper arm, BP Patient Position: Sitting)   Pulse 79   Ht 5' 2.99\" (1.6 m)   Wt 180 lb 6.4 oz (81.8 kg)   BMI 31.96 kg/m²      GENERAL FABIO: in no apparent distress and well developed and well nourished   HEENT: within normal limits   RESPIRATORY: lungs clear to auscultation   CARDIOVASC: Regular rate and rhythm   GASTROINT: soft, non-tender, without masses or organomegaly   MUSCULOSKEL: no joint tenderness, deformity or swelling   INTEGUMENT: no rash or abnormalities   EXTREMITIES: extremities normal, atraumatic, no cyanosis or edema   PELVIC: External genitalia: normal general appearance  Vaginal: normal without tenderness, induration or masses  Uterus: absent   RECTAL: rectal exam not indicated   RITCHIE SURVEY: Cervical, supraclavicular, and axillary nodes normal.   NEURO: Grossly normal       IMPRESSION AND PLAN:  Nyasia Archer has a history of stage I, grade 1 endometrial cancer. She has no evidence of disease on exam.  We will plan on seeing her back in one year for further surveillance. An electronic signature was used to sign this note.     Eloisa Loomis MD  07/19/22

## 2022-07-28 ENCOUNTER — TRANSCRIBE ORDER (OUTPATIENT)
Dept: SCHEDULING | Age: 72
End: 2022-07-28

## 2022-07-28 DIAGNOSIS — Z12.31 SCREENING MAMMOGRAM FOR HIGH-RISK PATIENT: Primary | ICD-10-CM

## 2022-08-22 ENCOUNTER — HOSPITAL ENCOUNTER (OUTPATIENT)
Dept: MAMMOGRAPHY | Age: 72
Discharge: HOME | End: 2022-08-22
Attending: INTERNAL MEDICINE
Payer: MEDICARE

## 2022-08-22 DIAGNOSIS — Z12.31 SCREENING MAMMOGRAM FOR HIGH-RISK PATIENT: ICD-10-CM

## 2022-08-22 PROCEDURE — 77067 SCR MAMMO BI INCL CAD: CPT

## 2023-08-10 ENCOUNTER — TRANSCRIBE ORDERS (OUTPATIENT)
Facility: HOSPITAL | Age: 73
End: 2023-08-10

## 2023-08-10 DIAGNOSIS — Z12.31 SCREENING MAMMOGRAM FOR HIGH-RISK PATIENT: Primary | ICD-10-CM

## 2023-08-25 ENCOUNTER — HOSPITAL ENCOUNTER (OUTPATIENT)
Facility: HOSPITAL | Age: 73
End: 2023-08-25
Payer: MEDICARE

## 2023-08-25 DIAGNOSIS — Z12.31 SCREENING MAMMOGRAM FOR HIGH-RISK PATIENT: ICD-10-CM

## 2023-08-25 PROCEDURE — 77067 SCR MAMMO BI INCL CAD: CPT

## 2024-06-18 ENCOUNTER — OFFICE VISIT (OUTPATIENT)
Age: 74
End: 2024-06-18
Payer: MEDICARE

## 2024-06-18 VITALS
WEIGHT: 170.8 LBS | DIASTOLIC BLOOD PRESSURE: 81 MMHG | SYSTOLIC BLOOD PRESSURE: 139 MMHG | HEART RATE: 73 BPM | BODY MASS INDEX: 30.26 KG/M2

## 2024-06-18 DIAGNOSIS — C54.9 MALIGNANT NEOPLASM OF CORPUS UTERI, UNSPECIFIED (HCC): Primary | ICD-10-CM

## 2024-06-18 PROCEDURE — 99212 OFFICE O/P EST SF 10 MIN: CPT | Performed by: OBSTETRICS & GYNECOLOGY

## 2024-06-18 RX ORDER — ESTRADIOL 0.1 MG/G
1 CREAM VAGINAL DAILY
Qty: 42.5 G | Refills: 11 | Status: SHIPPED | OUTPATIENT
Start: 2024-06-18

## 2024-06-18 NOTE — PROGRESS NOTES
Check up for history of endometrial cancer.   1. Have you been to the ER, urgent care clinic since your last visit?  Hospitalized since your last visit?no    2. Have you seen or consulted any other health care providers outside of the [facility] since your last visit?  Include any pap smears or colon screening.   no

## 2024-06-18 NOTE — PROGRESS NOTES
[facility] GYNECOLOGIC ONCOLOGY  [address] [place] [address], [address]  [place] [address]  P [phone]  F [phone]    Office Note  Patient ID:  Name:  Teresa Marcus  MRN:  [id number]  :  1950/74 y.o.  Date:  2024      HISTORY OF PRESENT ILLNESS:  Teresa Marcus is a 74 y.o.  postmenopausal female who is an established patient being seen for a history of stage IA, grade 1 endometrial cancer diagnosed in 2010.  She underwent a laparoscopic hysterectomy with staging and required no adjuvant therapy based upon the favorable pathology.  She had a repair of a cystocele and rectocele in early .      She presents today for routine surveillance.  She is doing well and without complaints.          ROS:   and GI review:  Negative  Cardiopulmonary review:  Negative   Musculoskeletal:  Negative    A comprehensive review of systems was negative except for that written in the History of Present Illness., 10 point ROS        Problem List:  Patient Active Problem List    Diagnosis Date Noted    Malignant neoplasm of corpus uteri, except isthmus (HCC) 2012     PMH:  Past Medical History:   Diagnosis Date    Arrhythmia     PVC's    Arthritis     Spine    Cancer (HCC)     Uternine - hysterectomy - no treatments required    Contact dermatitis and other eczema, due to unspecified cause     roseacea    GERD (gastroesophageal reflux disease)     Hypercholesterolemia     Hypertension     Menopause     Other ill-defined conditions(799.89)     cancer uterine    Other ill-defined conditions(799.89)     Tendonitis in Left Knee    Other ill-defined conditions(799.89)     Nasal Allergies    Tendonitis of shoulder 2/15    left shoulder    Vertigo       PSH:  Past Surgical History:   Procedure Laterality Date    CHOLECYSTECTOMY      COLONOSCOPY N/A 2020    COLONOSCOPY performed by Rolo Aggarwal Jr., MD at [facility] ENDOSCOPY    COLONOSCOPY      COLONOSCOPY,EDMUNDO GERBER,SNARE  2020

## 2024-09-04 ENCOUNTER — HOSPITAL ENCOUNTER (OUTPATIENT)
Facility: HOSPITAL | Age: 74
Discharge: HOME | End: 2024-09-07
Payer: MEDICARE

## 2024-09-04 VITALS — HEIGHT: 63 IN | WEIGHT: 170 LBS | BODY MASS INDEX: 30.12 KG/M2

## 2024-09-04 DIAGNOSIS — Z12.31 SCREENING MAMMOGRAM FOR HIGH-RISK PATIENT: ICD-10-CM

## 2024-09-04 PROCEDURE — 77067 SCR MAMMO BI INCL CAD: CPT

## 2025-04-01 ENCOUNTER — TELEPHONE (OUTPATIENT)
Age: 75
End: 2025-04-01

## 2025-04-01 NOTE — TELEPHONE ENCOUNTER
----- Message from Lawrence BRUNSON sent at 4/1/2025  8:17 AM EDT -----  Regarding: E[facility] Appointment Request  ECC Appointment Request    Patient needs appointment for ECC Appointment Type: New Patient.    Patient Requested Dates(s):Next week  Patient Requested Time:Afternoon  Provider Name:Rochelle Melgar    Reason for Appointment Request: New Patient - No appointments available during search  --------------------------------------------------------------------------------------------------------------------------    Relationship to Patient: Self     Call Back Information: OK to leave message on voicemail  Preferred Call Back Number: Phone [phone] or  [phone]

## 2025-04-01 NOTE — TELEPHONE ENCOUNTER
Left message to schedule NP appt with another physician in the office,  is booking a year in advance.

## 2025-06-24 RX ORDER — SODIUM CHLORIDE 0.9 % (FLUSH) 0.9 %
5-40 SYRINGE (ML) INJECTION PRN
Status: CANCELLED | OUTPATIENT
Start: 2025-06-24

## 2025-06-24 RX ORDER — SODIUM CHLORIDE 9 MG/ML
INJECTION, SOLUTION INTRAVENOUS PRN
Status: CANCELLED | OUTPATIENT
Start: 2025-06-24

## 2025-06-24 RX ORDER — SODIUM CHLORIDE 0.9 % (FLUSH) 0.9 %
5-40 SYRINGE (ML) INJECTION EVERY 12 HOURS SCHEDULED
Status: CANCELLED | OUTPATIENT
Start: 2025-06-24

## 2025-06-25 ENCOUNTER — ANESTHESIA EVENT (OUTPATIENT)
Facility: HOSPITAL | Age: 75
End: 2025-06-25
Payer: MEDICARE

## 2025-06-25 ENCOUNTER — ANESTHESIA (OUTPATIENT)
Facility: HOSPITAL | Age: 75
End: 2025-06-25
Payer: MEDICARE

## 2025-06-25 ENCOUNTER — HOSPITAL ENCOUNTER (OUTPATIENT)
Facility: HOSPITAL | Age: 75
Setting detail: OUTPATIENT SURGERY
Discharge: HOME | End: 2025-06-25
Attending: INTERNAL MEDICINE | Admitting: INTERNAL MEDICINE
Payer: MEDICARE

## 2025-06-25 VITALS
HEIGHT: 63 IN | SYSTOLIC BLOOD PRESSURE: 136 MMHG | DIASTOLIC BLOOD PRESSURE: 75 MMHG | BODY MASS INDEX: 28.17 KG/M2 | HEART RATE: 82 BPM | RESPIRATION RATE: 21 BRPM | WEIGHT: 159 LBS | TEMPERATURE: 98.1 F | OXYGEN SATURATION: 97 %

## 2025-06-25 PROCEDURE — 7100000010 HC PHASE II RECOVERY - FIRST 15 MIN: Performed by: INTERNAL MEDICINE

## 2025-06-25 PROCEDURE — 2709999900 HC NON-CHARGEABLE SUPPLY: Performed by: INTERNAL MEDICINE

## 2025-06-25 PROCEDURE — 3600007502: Performed by: INTERNAL MEDICINE

## 2025-06-25 PROCEDURE — 2580000003 HC RX 258: Performed by: NURSE ANESTHETIST, CERTIFIED REGISTERED

## 2025-06-25 PROCEDURE — 88305 TISSUE EXAM BY PATHOLOGIST: CPT

## 2025-06-25 PROCEDURE — 7100000011 HC PHASE II RECOVERY - ADDTL 15 MIN: Performed by: INTERNAL MEDICINE

## 2025-06-25 PROCEDURE — 6360000002 HC RX W HCPCS: Performed by: NURSE ANESTHETIST, CERTIFIED REGISTERED

## 2025-06-25 PROCEDURE — 3700000000 HC ANESTHESIA ATTENDED CARE: Performed by: INTERNAL MEDICINE

## 2025-06-25 PROCEDURE — 3700000001 HC ADD 15 MINUTES (ANESTHESIA): Performed by: INTERNAL MEDICINE

## 2025-06-25 PROCEDURE — 3600007512: Performed by: INTERNAL MEDICINE

## 2025-06-25 RX ORDER — LIDOCAINE HYDROCHLORIDE 20 MG/ML
INJECTION, SOLUTION EPIDURAL; INFILTRATION; INTRACAUDAL; PERINEURAL
Status: DISCONTINUED | OUTPATIENT
Start: 2025-06-25 | End: 2025-06-25 | Stop reason: SDUPTHER

## 2025-06-25 RX ORDER — SODIUM CHLORIDE 9 MG/ML
INJECTION, SOLUTION INTRAVENOUS
Status: DISCONTINUED | OUTPATIENT
Start: 2025-06-25 | End: 2025-06-25 | Stop reason: SDUPTHER

## 2025-06-25 RX ADMIN — LIDOCAINE HYDROCHLORIDE 50 MG: 20 INJECTION, SOLUTION EPIDURAL; INFILTRATION; INTRACAUDAL; PERINEURAL at 08:10

## 2025-06-25 RX ADMIN — PROPOFOL 20 MG: 10 INJECTION, EMULSION INTRAVENOUS at 08:13

## 2025-06-25 RX ADMIN — SODIUM CHLORIDE: 9 INJECTION, SOLUTION INTRAVENOUS at 08:07

## 2025-06-25 RX ADMIN — PROPOFOL 50 MG: 10 INJECTION, EMULSION INTRAVENOUS at 08:19

## 2025-06-25 RX ADMIN — PROPOFOL 50 MG: 10 INJECTION, EMULSION INTRAVENOUS at 08:10

## 2025-06-25 RX ADMIN — PROPOFOL 20 MG: 10 INJECTION, EMULSION INTRAVENOUS at 08:17

## 2025-06-25 ASSESSMENT — PAIN - FUNCTIONAL ASSESSMENT: PAIN_FUNCTIONAL_ASSESSMENT: NONE - DENIES PAIN

## 2025-06-25 NOTE — ANESTHESIA PRE PROCEDURE
Former   Substance Use Topics   • Alcohol use: No                                Counseling given: Not Answered      Vital Signs (Current): There were no vitals filed for this visit.                                           BP Readings from Last 3 Encounters:   06/18/24 139/81   07/19/22 131/87   07/13/21 (!) 155/90       NPO Status:                                                                                 BMI:   Wt Readings from Last 3 Encounters:   10/14/24 77.6 kg (171 lb)   09/04/24 77.1 kg (170 lb)   06/18/24 77.5 kg (170 lb 12.8 oz)     There is no height or weight on file to calculate BMI.    CBC: No results found for: \"WBC\", \"RBC\", \"HGB\", \"HCT\", \"MCV\", \"RDW\", \"PLT\"    CMP: No results found for: \"NA\", \"K\", \"CL\", \"CO2\", \"BUN\", \"CREATININE\", \"GFRAA\", \"AGRATIO\", \"LABGLOM\", \"GLUCOSE\", \"GLU\", \"CALCIUM\", \"BILITOT\", \"ALKPHOS\", \"AST\", \"ALT\"    POC Tests: No results for input(s): \"POCGLU\", \"POCNA\", \"POCK\", \"POCCL\", \"POCBUN\", \"POCHEMO\", \"POCHCT\" in the last 72 hours.    Coags: No results found for: \"PROTIME\", \"INR\", \"APTT\"    HCG (If Applicable): No results found for: \"PREGTESTUR\", \"PREGSERUM\", \"HCG\", \"HCGQUANT\"     ABGs: No results found for: \"PHART\", \"PO2ART\", \"ZRP8LCC\", \"ASM6LNB\", \"BEART\", \"M3JWQUIJ\"     Type & Screen (If Applicable):  Lab Results   Component Value Date    ABORH A POS 08/24/2010    LABANTI NEG 08/24/2010       Drug/Infectious Status (If Applicable):  No results found for: \"HIV\", \"HEPCAB\"    COVID-19 Screening (If Applicable): No results found for: \"COVID19\"        Anesthesia Evaluation  Patient summary reviewed  Airway: Mallampati: II       Mouth opening: > = 3 FB   Dental: normal exam         Pulmonary:normal exam  breath sounds clear to auscultation                             Cardiovascular:    (+) hypertension:, dysrhythmias: PVC        Rhythm: regular  Rate: normal                    Neuro/Psych:               GI/Hepatic/Renal:   (+) GERD:          Endo/Other:

## 2025-06-25 NOTE — OP NOTE
[facility]                  Colonoscopy Operative Report    6/25/2025      Teresa Marcus  [id number]  1950    Procedure Type:   Colonoscopy --screening     Indications:    Personal history of colon polyps (screening only), Screening colonoscopy     Pre-operative Diagnosis: see indication above    Post-operative Diagnosis:  See findings below    :  KURTIS Aggarwal Jr, MD    Referring Provider: Alyssa Gonzales MD      Sedation:  MAC anesthesia Propofol    Pre-Procedural Exam:      Airway: clear,  No airway problems anticipated  Heart: RRR, without gallops or rubs  Lungs: clear bilaterally without wheezes, crackles, or rhonchi  Abdomen: soft, nontender, nondistended, bowel sounds present  Mental Status: awake, alert and oriented to person, place and time     Procedure Details:  After informed consent was obtained with all risks and benefits of procedure explained and preoperative exam completed, the patient was taken to the endoscopy suite and placed in the left lateral decubitus position.  Upon sequential sedation as per above, a digital rectal exam was performed .  The Olympus videocolonoscope  was inserted in the rectum and carefully advanced to the cecum, which was identified by the ileocecal valve and appendiceal orifice.  The cecum was identified by the ileocecal valve and appendiceal orifice.  The quality of preparation was good.  The colonoscope was slowly withdrawn with careful evaluation between folds. Retroflexion in the rectum was completed demonstrating internal hemorrhoids.     Findings:   Rectum: Grade 1 internal hemorrhoid(s);  Sigmoid:     -Diverticulosis  Descending Colon: normal  Transverse Colon: normal  Ascending Colon: Two small 5-6 mm polyps, cold snared  Cecum: normal  Terminal Ileum: not intubated      Specimen Removed:  Ascending colon polyps    Complications: None.     EBL:  None.    Impression:    diverticulosis,  Mild in degree, involving

## 2025-06-25 NOTE — ANESTHESIA POSTPROCEDURE EVALUATION
Department of Anesthesiology  Postprocedure Note    Patient: Teresa Marcus  MRN: [id number]  YOB: 1950  Date of evaluation: 6/25/2025    Procedure Summary       Date: 06/25/25 Room / Location: [facility] ENDO 04 / [facility] ENDOSCOPY    Anesthesia Start: 0807 Anesthesia Stop: 0827    Procedure: COLONOSCOPY WITH BIOPSY, POLYPECTOMY (Lower GI Region) Diagnosis:       Personal history of colon polyps, unspecified      Family hx colonic polyps      (Personal history of colon polyps, unspecified [Z86.0100])      (Family hx colonic polyps [Z83.719])    Surgeons: Rolo Aggarwal Jr., MD Responsible Provider: Max Shields MD    Anesthesia Type: MAC ASA Status: 2            Anesthesia Type: MAC    Bouchra Phase I: Bouchra Score: 10    Bouchra Phase II: Bouchra Score: 10    Anesthesia Post Evaluation    Patient location during evaluation: PACU  Patient participation: complete - patient participated  Level of consciousness: sleepy but conscious and responsive to verbal stimuli  Airway patency: patent  Nausea & Vomiting: no vomiting and no nausea  Cardiovascular status: blood pressure returned to baseline and hemodynamically stable  Respiratory status: acceptable  Hydration status: stable    No notable events documented.

## 2025-06-25 NOTE — H&P
6/1/10  Yes Automatic Reconciliation, Ar   guaiFENesin (MUCINEX) 600 MG extended release tablet Take 1 tablet by mouth 2 times daily   Yes Automatic Reconciliation, Ar   indapamide (LOZOL) 2.5 MG tablet Take 1 tablet by mouth daily   Yes Automatic Reconciliation, Ar   potassium chloride (KLOR-CON) 10 MEQ extended release tablet TAKE 2 TABLETS BY MOUTH ONCE DAILY 6/17/20  Yes Automatic Reconciliation, Ar   estradiol (ESTRACE) 0.1 MG/GM vaginal cream Place 1 g vaginally daily 6/18/24   Oscar Calvillo Jr., MD   EPINEPHrine (EPIPEN 2-FRITZ) 0.3 MG/0.3ML SOAJ injection ceived the following from [facility]: Outside name: EPIPEN 0.3 mg/0.3 mL injection 4/23/12   Automatic Reconciliation, Ar       Physical Exam:     General: well developed, well nourished   HEENT: unremarkable   Heart: regular rhythm no mumur    Lungs: clear   Abdominal:  benign   Neurological: unremarkable   Extremities: no edema     Findings/Diagnosis: Personal history of colonic polyps    Plan of Care/Planned Procedure: Colonoscopy with monitored anesthesia care sedation    Signed:  KURTIS Aggarwal MD 6/25/2025

## 2025-06-25 NOTE — DISCHARGE INSTRUCTIONS
ZACHERY Aggarwal MD  [facility]  [address], [address]  [place] [address] [phone]  [email or web address]    Teresa Marcus  [id number]  1950    COLON DISCHARGE INSTRUCTIONS  Discomfort:  Redness at IV site- apply warm compress to area; if redness or soreness persist- contact your physician  There may be a slight amount of blood passed from the rectum  Gaseous discomfort- walking, belching will help relieve any discomfort  You may not operate a vehicle for 12 hours  You may not engage in an occupation involving machinery or appliances for rest of today  You may not drink alcoholic beverages for at least 12 hours  Avoid making any critical decisions for at least 24 hour  DIET:   High fiber diet.   - however -  remember your colon is empty and a heavy meal will produce gas.   Avoid these foods:  vegetables, fried / greasy foods, carbonated drinks for today      ACTIVITY:  You may resume your normal daily activities it is recommended that you spend the remainder of the day resting -  avoid any strenuous activity.    CALL M.D.  ANY SIGN OF:   Increasing pain, nausea, vomiting  Abdominal distension (swelling)  New increased bleeding (oral or rectal)  Fever (chills)  Pain in chest area  Bloody discharge from nose or mouth  Shortness of breath     COLONOSCOPY FINDINGS:  Your colonoscopy showed: two small polyps which were removed.    Follow-up Instructions:   Call Dr. KURTIS Aggarwal if any questions or problems.   Telephone # [phone]  Dr. Aggarwal's office will notify you of the biopsy results within 7 to 10 days.  Should have a repeat colonoscopy in 5 years.  Patient Education on Sedation / Analgesia Administered for Procedure      For 24 hours after general anesthesia or intravenous analgesia / sedation:  Have someone responsible help you with your care  Limit your activities  Do not drive and operate hazardous machinery  Do not make important

## 2025-06-25 NOTE — PROGRESS NOTES
Endoscopy Case End Note:     Procedure scope was pre-cleaned, per protocol, at bedside by ZACK Luna.       Report received from anesthesia.  See anesthesia flowsheet for intra-procedure vital signs and events.    [place] returned to patient. Belongings remain under stretcher with patient.

## 2025-08-08 ENCOUNTER — TRANSCRIBE ORDERS (OUTPATIENT)
Facility: HOSPITAL | Age: 75
End: 2025-08-08

## 2025-08-08 DIAGNOSIS — Z12.31 VISIT FOR SCREENING MAMMOGRAM: Primary | ICD-10-CM

## (undated) DEVICE — NON-REM POLYHESIVE PATIENT RETURN ELECTRODE: Brand: VALLEYLAB

## (undated) DEVICE — NEEDLE HYPO 18GA L1.5IN PNK S STL HUB POLYPR SHLD REG BVL

## (undated) DEVICE — SOLIDIFIER MEDC 1200ML -- CONVERT TO 356117

## (undated) DEVICE — SET ADMIN 16ML TBNG L100IN 2 Y INJ SITE IV PIGGY BK DISP

## (undated) DEVICE — TOWEL 4 PLY TISS 19X30 SUE WHT

## (undated) DEVICE — CONTAINER SPEC 20 ML LID NEUT BUFF FORMALIN 10 % POLYPR STS

## (undated) DEVICE — ENDOSCOPIC KIT COMPLIANCE ENDOKIT

## (undated) DEVICE — Z DISCONTINUED PER MEDLINE LINE GAS SAMPLING O2/CO2 LNG AD 13 FT NSL W/ TBNG FILTERLINE

## (undated) DEVICE — IV START KIT: Brand: MEDLINE

## (undated) DEVICE — Device

## (undated) DEVICE — CATH IV AUTOGRD BC PNK 20GA 25 -- INSYTE

## (undated) DEVICE — TIP SUCT TRNSPAR RIB SURF STD BLB RIG NVENT W/ 5IN1 CONN DYND50138] MEDLINE INDUSTRIES INC]

## (undated) DEVICE — TRAP ENDOSCP POLYP 2 CHMBR DRAWER TYP

## (undated) DEVICE — SNARE ENDOSCP M L240CM W27MM SHTH DIA2.4MM CHN 2.8MM OVL

## (undated) DEVICE — SNARE ENDO CAPTIVATOR W10MM X L240CM RND INSUL STIFF-UOM BOX OF 10

## (undated) DEVICE — ELECTRODE,RADIOTRANSLUCENT,FOAM,5PK: Brand: MEDLINE

## (undated) DEVICE — SET GRAV CK VLV NEEDLESS ST 3 GANGED 4WAY STPCOCK HI FLO 10

## (undated) DEVICE — FORCEPS BX L240CM JAW DIA2.4MM ORNG L CAP W/ NDL DISP RAD

## (undated) DEVICE — TRAP,MUCUS SPECIMEN, 80CC: Brand: MEDLINE

## (undated) DEVICE — STRAINER URIN CALC RNL MSH -- CONVERT TO ITEM 357634

## (undated) DEVICE — NEONATAL-ADULT SPO2 SENSOR: Brand: NELLCOR

## (undated) DEVICE — CUFF BLD PRSS AD CLTH SGL TB W/ BAYNT CONN ROUNDED CORNER

## (undated) DEVICE — 1200 GUARD II KIT W/5MM TUBE W/O VAC TUBE: Brand: GUARDIAN

## (undated) DEVICE — SYR 10ML LUER LOK 1/5ML GRAD --

## (undated) DEVICE — ELECTRODE PT RET AD L9FT HI MOIST COND ADH HYDRGEL CORDED

## (undated) DEVICE — BASIN EMSIS 16OZ GRAPHITE PLAS KID SHP MOLD GRAD FOR ORAL

## (undated) DEVICE — SYR 3ML LL TIP 1/10ML GRAD --